# Patient Record
Sex: FEMALE | Race: WHITE | NOT HISPANIC OR LATINO | Employment: FULL TIME | URBAN - METROPOLITAN AREA
[De-identification: names, ages, dates, MRNs, and addresses within clinical notes are randomized per-mention and may not be internally consistent; named-entity substitution may affect disease eponyms.]

---

## 2017-05-01 ENCOUNTER — OFFICE VISIT (OUTPATIENT)
Dept: FAMILY MEDICINE | Facility: CLINIC | Age: 32
End: 2017-05-01
Payer: COMMERCIAL

## 2017-05-01 VITALS
BODY MASS INDEX: 34.97 KG/M2 | HEIGHT: 66 IN | SYSTOLIC BLOOD PRESSURE: 128 MMHG | DIASTOLIC BLOOD PRESSURE: 86 MMHG | HEART RATE: 100 BPM | TEMPERATURE: 99.5 F | WEIGHT: 217.6 LBS

## 2017-05-01 DIAGNOSIS — B65.3 SWIMMER ITCH: Primary | ICD-10-CM

## 2017-05-01 DIAGNOSIS — F41.9 ANXIETY: ICD-10-CM

## 2017-05-01 DIAGNOSIS — G43.919 INTRACTABLE MIGRAINE WITHOUT STATUS MIGRAINOSUS, UNSPECIFIED MIGRAINE TYPE: ICD-10-CM

## 2017-05-01 PROCEDURE — 99203 OFFICE O/P NEW LOW 30 MIN: CPT | Performed by: NURSE PRACTITIONER

## 2017-05-01 RX ORDER — SUMATRIPTAN 50 MG/1
50 TABLET, FILM COATED ORAL
Qty: 9 TABLET | Refills: 5 | Status: SHIPPED | OUTPATIENT
Start: 2017-05-01 | End: 2022-03-04 | Stop reason: DRUGHIGH

## 2017-05-01 RX ORDER — LORAZEPAM 0.5 MG/1
TABLET ORAL
Qty: 20 TABLET | Refills: 0 | Status: SHIPPED | OUTPATIENT
Start: 2017-05-01 | End: 2022-03-04

## 2017-05-01 RX ORDER — TRIAMCINOLONE ACETONIDE 1 MG/G
CREAM TOPICAL
Qty: 30 G | Refills: 0 | Status: SHIPPED | OUTPATIENT
Start: 2017-05-01 | End: 2022-03-04

## 2017-05-01 RX ORDER — BUSPIRONE HYDROCHLORIDE 10 MG/1
10 TABLET ORAL 2 TIMES DAILY
Qty: 60 TABLET | Refills: 2 | Status: SHIPPED | OUTPATIENT
Start: 2017-05-01 | End: 2022-03-04

## 2017-05-01 NOTE — NURSING NOTE
"Chief Complaint   Patient presents with     Insect Bites     Insect Bites on Lower Legs      Health Maintenance     Patient reminded due for pappe/pe tdap, she will re schedule for these things       Initial /86 (BP Location: Left arm, Patient Position: Chair, Cuff Size: Adult Large)  Pulse 100  Temp 99.5  F (37.5  C) (Tympanic)  Ht 5' 5.75\" (1.67 m)  Wt 217 lb 9.6 oz (98.7 kg)  LMP 04/08/2017  Breastfeeding? No  BMI 35.39 kg/m2 Estimated body mass index is 35.39 kg/(m^2) as calculated from the following:    Height as of this encounter: 5' 5.75\" (1.67 m).    Weight as of this encounter: 217 lb 9.6 oz (98.7 kg).  Medication Reconciliation: complete    "
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
## 2017-05-01 NOTE — MR AVS SNAPSHOT
After Visit Summary   5/1/2017    jason Barkley    MRN: [id number]           Patient Information     Date Of Birth          1985        Visit Information        Provider Department      5/1/2017 2:40 PM Kerri Rivera APRN CNP [facility]        Today's Diagnoses     Swimmer itch    -  1    Intractable migraine without status migrainosus, unspecified migraine type        Anxiety          Care Instructions    Swimmers itch    Apply Kenalog cream 3 times daily until symptoms resolve.    Imitrex 1 tablet as needed at onset of the headache, may take second tablet in 2 hours if still having headache.     For anxiety:    Start Buspar 1 tablet twice daily     Ativan 1 tablet as needed for severe anxiety, panic attacks, not for daily use    Follow up in 1-2 months or sooner to recheck treatment plan.     Thank you for choosing [facility].  You may be receiving a survey in the mail from Tiffanie Kruger regarding your visit today.  Please take a few minutes to complete and return the survey to let us know how we are doing.      If you have questions or concerns, please contact us via [employer] or you can contact your care team at [phone].    Our Clinic hours are:  Monday 6:40 am  to 7:00 pm  Tuesday -Friday 6:40 am to 5:00 pm    The [place] outpatient lab hours are:  Monday - Friday 6:10 am to 4:45 pm  Saturdays 7:00 am to 11:00 am  Appointments are required, call [phone]    If you have clinical questions after hours or would like to schedule an appointment,  call the clinic at [phone].          Follow-ups after your visit        Who to contact     If you have questions or need follow up information about today's clinic visit or your schedule please contact [facility] directly at [phone].  Normal or non-critical lab and imaging results will be communicated to you by MyChart, letter or phone within 4 business days after the clinic has received the results.  "If you do not hear from us within 7 days, please contact the clinic through [employer] or phone. If you have a critical or abnormal lab result, we will notify you by phone as soon as possible.  Submit refill requests through [employer] or call your pharmacy and they will forward the refill request to us. Please allow 3 business days for your refill to be completed.          Additional Information About Your Visit        [employer] Information     [employer] lets you send messages to your doctor, view your test results, renew your prescriptions, schedule appointments and more. To sign up, go to [email or web address] . Click on \"Log in\" on the left side of the screen, which will take you to the Welcome page. Then click on \"Sign up Now\" on the right side of the page.     You will be asked to enter the access code listed below, as well as some personal information. Please follow the directions to create your username and password.     Your access code is: [email or web address]  Expires: 2017  2:47 PM     Your access code will  in 90 days. If you need help or a new code, please call your [place] clinic or [phone].        Care EveryWhere ID     This is your Care EveryWhere ID. This could be used by other organizations to access your [place] medical records  [id number]        Your Vitals Were     Pulse Temperature Height Last Period Breastfeeding? BMI (Body Mass Index)    100 99.5  F (37.5  C) (Tympanic) 5' 5.75\" (1.67 m) 2017 No 35.39 kg/m2       Blood Pressure from Last 3 Encounters:   17 128/86    Weight from Last 3 Encounters:   17 217 lb 9.6 oz (98.7 kg)              Today, you had the following     No orders found for display         Today's Medication Changes          These changes are accurate as of: 17  2:47 PM.  If you have any questions, ask your nurse or doctor.               Start taking these medicines.        Dose/Directions    busPIRone 10 MG tablet   Commonly known as:  BUSPAR   Used " for:  Anxiety   Started by:  Kerri Rivera APRN CNP        Dose:  10 mg   Take 1 tablet (10 mg) by mouth 2 times daily   Quantity:  60 tablet   Refills:  2       LORazepam 0.5 MG tablet   Commonly known as:  ATIVAN   Used for:  Anxiety   Started by:  Kerri Rivera APRN CNP        1 tablet as needed for anxiety, panic attacks, not for daily use   Quantity:  20 tablet   Refills:  0       SUMAtriptan 50 MG tablet   Commonly known as:  IMITREX   Used for:  Intractable migraine without status migrainosus, unspecified migraine type   Started by:  Kerri Rivera APRN CNP        Dose:  50 mg   Take 1 tablet (50 mg) by mouth at onset of headache for migraine May repeat in 2 hours. Max 4 tablets/24 hours.   Quantity:  9 tablet   Refills:  5       triamcinolone 0.1 % cream   Commonly known as:  KENALOG   Used for:  Swimmer itch   Started by:  Kerri Rivera APRN CNP        Apply sparingly to affected area three times daily for up to 14 days   Quantity:  30 g   Refills:  0            Where to get your medicines      These medications were sent to [facility] [address]  [address] [facility] [address]     Phone:  [phone]     busPIRone 10 MG tablet    SUMAtriptan 50 MG tablet    triamcinolone 0.1 % cream         Some of these will need a paper prescription and others can be bought over the counter.  Ask your nurse if you have questions.     Bring a paper prescription for each of these medications     LORazepam 0.5 MG tablet                Primary Care Provider    None       No address on file        Thank you!     Thank you for choosing [facility]  for your care. Our goal is always to provide you with excellent care. Hearing back from our patients is one way we can continue to improve our services. Please take a few minutes to complete the written survey that you may receive in the mail after your visit with us. Thank you!              Your Updated Medication List - Protect others around you: Learn how to safely use, store and throw away your medicines at www.disposemymeds.org.          This list is accurate as of: 5/1/17  2:47 PM.  Always use your most recent med list.                   Brand Name Dispense Instructions for use    busPIRone 10 MG tablet    BUSPAR    60 tablet    Take 1 tablet (10 mg) by mouth 2 times daily       LORazepam 0.5 MG tablet    ATIVAN    20 tablet    1 tablet as needed for anxiety, panic attacks, not for daily use       SUMAtriptan 50 MG tablet    IMITREX    9 tablet    Take 1 tablet (50 mg) by mouth at onset of headache for migraine May repeat in 2 hours. Max 4 tablets/24 hours.       triamcinolone 0.1 % cream    KENALOG    30 g    Apply sparingly to affected area three times daily for up to 14 days

## 2017-05-01 NOTE — PATIENT INSTRUCTIONS
Swimmers itch: Apply Kenalog cream 3 times daily until symptoms resolve.    Imitrex 1 tablet as needed at onset of the headache, may take second tablet in 2 hours if still having headache.     For anxiety:    Start Buspar 1 tablet twice daily     Ativan 1 tablet as needed for severe anxiety, panic attacks, not for daily use    Follow up in 1-2 months or sooner to recheck treatment plan.     Thank you for choosing [facility].  You may be receiving a survey in the mail from Tiffanie Kruger regarding your visit today.  Please take a few minutes to complete and return the survey to let us know how we are doing.      If you have questions or concerns, please contact us via [employer] or you can contact your care team at [phone].    Our Clinic hours are:  Monday 6:40 am  to 7:00 pm  Tuesday -Friday 6:40 am to 5:00 pm    The [place] outpatient lab hours are:  Monday - Friday 6:10 am to 4:45 pm  Saturdays 7:00 am to 11:00 am  Appointments are required, call [phone]    If you have clinical questions after hours or would like to schedule an appointment,  call the clinic at [phone].

## 2017-11-15 ENCOUNTER — TELEPHONE (OUTPATIENT)
Dept: FAMILY MEDICINE | Facility: CLINIC | Age: 32
End: 2017-11-15

## 2017-11-15 NOTE — TELEPHONE ENCOUNTER
Panel Management Review      Patient has the following on her problem list: None      Composite cancer screening  Chart review shows that this patient is due/due soon for the following Pap Smear  Summary:    Patient is due/failing the following:   PAP    Action needed:   Patient needs office visit for physical and pap.    Type of outreach:    Sent letter.    Questions for provider review:    None                                                                                                                                    Nanci Bernal

## 2018-02-07 ENCOUNTER — TELEPHONE (OUTPATIENT)
Dept: FAMILY MEDICINE | Facility: CLINIC | Age: 33
End: 2018-02-07

## 2018-02-07 NOTE — LETTER
[facility]  [address] [facility] [place] [address]  [phone]        February 7, 2018  jason Barkley  [address]  [place] [address]    Dear jason,    I care about your health and have reviewed your health plan. I have reviewed your medical conditions, medication list, and lab results and am making recommendations based on this review, to better manage your health.    You are in particular need of attention regarding:  -Cervical Cancer Screening    I am recommending that you:  -schedule a PAP SMEAR EXAM which is due.  Please disregard this reminder if you have had this exam elsewhere within the last year.  It would be helpful for us to have a copy of your recent pap smear report in our file so that we can best coordinate your care.    Here is a list of Health Maintenance topics that are due now or due soon:  Health Maintenance Due   Topic Date Due     MIGRAINE ACTION PLAN  01/27/2004     TETANUS IMMUNIZATION (SYSTEM ASSIGNED)  01/27/2004     PAP SCREENING Q3 YR (SYSTEM ASSIGNED)  01/27/2007     INFLUENZA VACCINE (SYSTEM ASSIGNED)  09/01/2017       Please call us at [phone] (or use [employer]) to address the above recommendations.     Thank you for trusting [facility] and we appreciate the opportunity to serve you.  We look forward to supporting your healthcare needs in the future.    Healthy Regards,    [facility] care team

## 2018-05-18 ENCOUNTER — TELEPHONE (OUTPATIENT)
Dept: FAMILY MEDICINE | Facility: CLINIC | Age: 33
End: 2018-05-18

## 2018-05-18 NOTE — TELEPHONE ENCOUNTER
Panel Management Review        Composite cancer screening  Chart review shows that this patient is due/due soon for the following Pap Smear  Summary:    Patient is due/failing the following:   PAP    Action needed:   Patient needs office visit for physical and pap .    Type of outreach:    Sent letter.    Questions for provider review:    None                                                                                                                                    Nanci Bernal

## 2018-05-18 NOTE — LETTER
[facility]  [address]  [place] [address]  [phone]        May 18, 2018  jason Barkley  [address]  [place] [address]    Dear jason,    I care about your health and have reviewed your health plan. I have reviewed your medical conditions, medication list, and lab results and am making recommendations based on this review, to better manage your health.    You are in particular need of attention regarding:  -Cervical Cancer Screening    I am recommending that you:  -schedule a PAP SMEAR EXAM which is due.  Please disregard this reminder if you have had this exam elsewhere within the last year.  It would be helpful for us to have a copy of your recent pap smear report in our file so that we can best coordinate your care.    Here is a list of Health Maintenance topics that are due now or due soon:  Health Maintenance Due   Topic Date Due     MIGRAINE ACTION PLAN  01/27/2004     TETANUS IMMUNIZATION (SYSTEM ASSIGNED)  01/27/2004     HIV SCREEN (SYSTEM ASSIGNED)  01/27/2004     PAP SCREENING Q3 YR (SYSTEM ASSIGNED)  01/27/2007       Please call us at [phone] (or use [employer]) to address the above recommendations.     Thank you for trusting [facility] and we appreciate the opportunity to serve you.  We look forward to supporting your healthcare needs in the future.    Healthy Regards,    [facility] care team

## 2019-02-27 ENCOUNTER — OFFICE VISIT - HEALTHEAST (OUTPATIENT)
Dept: FAMILY MEDICINE | Facility: CLINIC | Age: 34
End: 2019-02-27

## 2019-02-27 DIAGNOSIS — Z76.89 ENCOUNTER TO ESTABLISH CARE: ICD-10-CM

## 2019-02-27 DIAGNOSIS — B97.7 HIGH RISK HPV INFECTION: ICD-10-CM

## 2019-02-27 DIAGNOSIS — N92.0 MENORRHAGIA WITH REGULAR CYCLE: ICD-10-CM

## 2019-02-27 DIAGNOSIS — J30.81 ALLERGIC RHINITIS DUE TO ANIMAL HAIR AND DANDER: ICD-10-CM

## 2019-02-27 DIAGNOSIS — Z72.0 TOBACCO USE: ICD-10-CM

## 2019-02-27 DIAGNOSIS — J45.20 MILD INTERMITTENT ASTHMA WITHOUT COMPLICATION: ICD-10-CM

## 2019-02-27 DIAGNOSIS — E66.811 CLASS 1 OBESITY DUE TO EXCESS CALORIES WITHOUT SERIOUS COMORBIDITY WITH BODY MASS INDEX (BMI) OF 31.0 TO 31.9 IN ADULT: ICD-10-CM

## 2019-02-27 DIAGNOSIS — R87.613 HSIL (HIGH GRADE SQUAMOUS INTRAEPITHELIAL LESION) ON PAP SMEAR OF CERVIX: ICD-10-CM

## 2019-02-27 DIAGNOSIS — R87.613 HSIL ON PAP SMEAR OF CERVIX: ICD-10-CM

## 2019-02-27 DIAGNOSIS — Z12.4 CERVICAL CANCER SCREENING: ICD-10-CM

## 2019-02-27 DIAGNOSIS — Z00.00 ANNUAL PHYSICAL EXAM: ICD-10-CM

## 2019-02-27 DIAGNOSIS — G43.109 MIGRAINE WITH AURA AND WITHOUT STATUS MIGRAINOSUS, NOT INTRACTABLE: ICD-10-CM

## 2019-02-27 DIAGNOSIS — E66.09 CLASS 1 OBESITY DUE TO EXCESS CALORIES WITHOUT SERIOUS COMORBIDITY WITH BODY MASS INDEX (BMI) OF 31.0 TO 31.9 IN ADULT: ICD-10-CM

## 2019-02-27 LAB
ANION GAP SERPL CALCULATED.3IONS-SCNC: 11 MMOL/L (ref 5–18)
BUN SERPL-MCNC: 18 MG/DL (ref 8–22)
CALCIUM SERPL-MCNC: 9.4 MG/DL (ref 8.5–10.5)
CHLORIDE BLD-SCNC: 104 MMOL/L (ref 98–107)
CHOLEST SERPL-MCNC: 221 MG/DL
CO2 SERPL-SCNC: 26 MMOL/L (ref 22–31)
CREAT SERPL-MCNC: 0.72 MG/DL (ref 0.6–1.1)
ERYTHROCYTE [DISTWIDTH] IN BLOOD BY AUTOMATED COUNT: 11.6 % (ref 11–14.5)
FASTING STATUS PATIENT QL REPORTED: YES
GFR SERPL CREATININE-BSD FRML MDRD: >60 ML/MIN/1.73M2
GLUCOSE BLD-MCNC: 100 MG/DL (ref 70–125)
HCT VFR BLD AUTO: 42.1 % (ref 35–47)
HDLC SERPL-MCNC: 57 MG/DL
HGB BLD-MCNC: 14.3 G/DL (ref 12–16)
LDLC SERPL CALC-MCNC: 137 MG/DL
MCH RBC QN AUTO: 30.8 PG (ref 27–34)
MCHC RBC AUTO-ENTMCNC: 34.1 G/DL (ref 32–36)
MCV RBC AUTO: 90 FL (ref 80–100)
PLATELET # BLD AUTO: 266 THOU/UL (ref 140–440)
PMV BLD AUTO: 7.3 FL (ref 7–10)
POTASSIUM BLD-SCNC: 4 MMOL/L (ref 3.5–5)
RBC # BLD AUTO: 4.65 MILL/UL (ref 3.8–5.4)
SODIUM SERPL-SCNC: 141 MMOL/L (ref 136–145)
TRIGL SERPL-MCNC: 135 MG/DL
TSH SERPL DL<=0.005 MIU/L-ACNC: 1.22 UIU/ML (ref 0.3–5)
WBC: 10.1 THOU/UL (ref 4–11)

## 2019-02-27 ASSESSMENT — MIFFLIN-ST. JEOR: SCORE: 1625.52

## 2019-02-28 LAB
HPV SOURCE: ABNORMAL
HUMAN PAPILLOMA VIRUS 16 DNA: POSITIVE
HUMAN PAPILLOMA VIRUS 18 DNA: NEGATIVE
HUMAN PAPILLOMA VIRUS FINAL DIAGNOSIS: ABNORMAL
HUMAN PAPILLOMA VIRUS OTHER HR: NEGATIVE
SPECIMEN DESCRIPTION: ABNORMAL

## 2019-03-02 ENCOUNTER — COMMUNICATION - HEALTHEAST (OUTPATIENT)
Dept: FAMILY MEDICINE | Facility: CLINIC | Age: 34
End: 2019-03-02

## 2019-03-07 ENCOUNTER — OFFICE VISIT - HEALTHEAST (OUTPATIENT)
Dept: ALLERGY | Facility: CLINIC | Age: 34
End: 2019-03-07

## 2019-03-07 DIAGNOSIS — T63.441A BEE STING REACTION, ACCIDENTAL OR UNINTENTIONAL, INITIAL ENCOUNTER: ICD-10-CM

## 2019-03-07 DIAGNOSIS — J45.30 MILD PERSISTENT ASTHMA WITHOUT COMPLICATION: ICD-10-CM

## 2019-03-07 DIAGNOSIS — J30.81 ALLERGIC RHINITIS DUE TO ANIMALS: ICD-10-CM

## 2019-03-07 ASSESSMENT — MIFFLIN-ST. JEOR: SCORE: 1622.35

## 2019-03-08 ENCOUNTER — COMMUNICATION - HEALTHEAST (OUTPATIENT)
Dept: FAMILY MEDICINE | Facility: CLINIC | Age: 34
End: 2019-03-08

## 2019-03-08 LAB
BKR LAB AP ABNORMAL BLEEDING: NO
BKR LAB AP BIRTH CONTROL/HORMONES: ABNORMAL
BKR LAB AP CERVICAL APPEARANCE: NORMAL
BKR LAB AP GYN ADEQUACY: ABNORMAL
BKR LAB AP GYN INTERPRETATION: ABNORMAL
BKR LAB AP HPV REFLEX: ABNORMAL
BKR LAB AP LMP: ABNORMAL
BKR LAB AP PATIENT STATUS: ABNORMAL
BKR LAB AP PREVIOUS ABNORMAL: ABNORMAL
BKR LAB AP PREVIOUS NORMAL: ABNORMAL
HIGH RISK?: NO
INTERPRETING LAB: ABNORMAL
PATH REPORT.COMMENTS IMP SPEC: ABNORMAL
RESULT FLAG (HE HISTORICAL CONVERSION): ABNORMAL

## 2019-03-19 ENCOUNTER — RECORDS - HEALTHEAST (OUTPATIENT)
Dept: ADMINISTRATIVE | Facility: OTHER | Age: 34
End: 2019-03-19

## 2019-04-02 ENCOUNTER — OFFICE VISIT - HEALTHEAST (OUTPATIENT)
Dept: FAMILY MEDICINE | Facility: CLINIC | Age: 34
End: 2019-04-02

## 2019-04-02 ENCOUNTER — COMMUNICATION - HEALTHEAST (OUTPATIENT)
Dept: FAMILY MEDICINE | Facility: CLINIC | Age: 34
End: 2019-04-02

## 2019-04-02 DIAGNOSIS — Z01.818 ENCOUNTER FOR PREOPERATIVE EXAMINATION FOR GENERAL SURGICAL PROCEDURE: ICD-10-CM

## 2019-04-02 DIAGNOSIS — R87.613 HSIL ON PAP SMEAR OF CERVIX: ICD-10-CM

## 2019-04-02 LAB
HCG UR QL: NEGATIVE
HGB BLD-MCNC: 14.1 G/DL (ref 12–16)

## 2019-04-02 ASSESSMENT — MIFFLIN-ST. JEOR: SCORE: 1628.02

## 2019-04-12 ASSESSMENT — MIFFLIN-ST. JEOR: SCORE: 1626.88

## 2019-04-15 ENCOUNTER — ANESTHESIA - HEALTHEAST (OUTPATIENT)
Dept: SURGERY | Facility: AMBULATORY SURGERY CENTER | Age: 34
End: 2019-04-15

## 2019-04-16 ENCOUNTER — SURGERY - HEALTHEAST (OUTPATIENT)
Dept: SURGERY | Facility: AMBULATORY SURGERY CENTER | Age: 34
End: 2019-04-16

## 2019-04-16 ASSESSMENT — MIFFLIN-ST. JEOR: SCORE: 1626.88

## 2019-04-18 ENCOUNTER — OFFICE VISIT - HEALTHEAST (OUTPATIENT)
Dept: ALLERGY | Facility: CLINIC | Age: 34
End: 2019-04-18

## 2019-04-18 DIAGNOSIS — J30.81 ALLERGIC RHINITIS DUE TO ANIMALS: ICD-10-CM

## 2020-01-02 ENCOUNTER — COMMUNICATION - HEALTHEAST (OUTPATIENT)
Dept: FAMILY MEDICINE | Facility: CLINIC | Age: 35
End: 2020-01-02

## 2021-02-02 ENCOUNTER — OFFICE VISIT - HEALTHEAST (OUTPATIENT)
Dept: FAMILY MEDICINE | Facility: CLINIC | Age: 36
End: 2021-02-02

## 2021-02-02 DIAGNOSIS — Z13.1 SCREENING FOR DIABETES MELLITUS: ICD-10-CM

## 2021-02-02 DIAGNOSIS — Z00.00 ANNUAL PHYSICAL EXAM: ICD-10-CM

## 2021-02-02 DIAGNOSIS — Z72.0 TOBACCO USE: ICD-10-CM

## 2021-02-02 DIAGNOSIS — Z83.3 FAMILY HISTORY OF DIABETES MELLITUS: ICD-10-CM

## 2021-02-02 DIAGNOSIS — Z91.030 ALLERGY TO HONEY BEE VENOM: ICD-10-CM

## 2021-02-02 DIAGNOSIS — N60.12 FIBROCYSTIC BREAST CHANGES OF BOTH BREASTS: ICD-10-CM

## 2021-02-02 DIAGNOSIS — Z11.4 SCREENING FOR HUMAN IMMUNODEFICIENCY VIRUS WITHOUT PRESENCE OF RISK FACTORS: ICD-10-CM

## 2021-02-02 DIAGNOSIS — G43.109 MIGRAINE WITH AURA AND WITHOUT STATUS MIGRAINOSUS, NOT INTRACTABLE: ICD-10-CM

## 2021-02-02 DIAGNOSIS — Z28.21 INFLUENZA VACCINATION DECLINED: ICD-10-CM

## 2021-02-02 DIAGNOSIS — Z23 IMMUNIZATION DUE: ICD-10-CM

## 2021-02-02 DIAGNOSIS — Z87.42 HISTORY OF ABNORMAL CERVICAL PAP SMEAR: ICD-10-CM

## 2021-02-02 DIAGNOSIS — Z13.220 LIPID SCREENING: ICD-10-CM

## 2021-02-02 DIAGNOSIS — K64.4 RESIDUAL HEMORRHOIDAL SKIN TAGS: ICD-10-CM

## 2021-02-02 DIAGNOSIS — Z11.59 ENCOUNTER FOR HEPATITIS C SCREENING TEST FOR LOW RISK PATIENT: ICD-10-CM

## 2021-02-02 DIAGNOSIS — J45.20 MILD INTERMITTENT ASTHMA WITHOUT COMPLICATION: ICD-10-CM

## 2021-02-02 DIAGNOSIS — N60.11 FIBROCYSTIC BREAST CHANGES OF BOTH BREASTS: ICD-10-CM

## 2021-02-02 LAB
ALBUMIN SERPL-MCNC: 4.1 G/DL (ref 3.5–5)
ALP SERPL-CCNC: 69 U/L (ref 45–120)
ALT SERPL W P-5'-P-CCNC: 27 U/L (ref 0–45)
ANION GAP SERPL CALCULATED.3IONS-SCNC: 11 MMOL/L (ref 5–18)
AST SERPL W P-5'-P-CCNC: 20 U/L (ref 0–40)
BILIRUB SERPL-MCNC: 0.7 MG/DL (ref 0–1)
BUN SERPL-MCNC: 10 MG/DL (ref 8–22)
CALCIUM SERPL-MCNC: 8.8 MG/DL (ref 8.5–10.5)
CHLORIDE BLD-SCNC: 105 MMOL/L (ref 98–107)
CHOLEST SERPL-MCNC: 172 MG/DL
CO2 SERPL-SCNC: 21 MMOL/L (ref 22–31)
CREAT SERPL-MCNC: 0.73 MG/DL (ref 0.6–1.1)
ERYTHROCYTE [DISTWIDTH] IN BLOOD BY AUTOMATED COUNT: 15.3 % (ref 11–14.5)
FASTING STATUS PATIENT QL REPORTED: YES
GFR SERPL CREATININE-BSD FRML MDRD: >60 ML/MIN/1.73M2
GLUCOSE BLD-MCNC: 85 MG/DL (ref 70–125)
HCT VFR BLD AUTO: 39.4 % (ref 35–47)
HDLC SERPL-MCNC: 52 MG/DL
HGB BLD-MCNC: 13 G/DL (ref 12–16)
HIV 1+2 AB+HIV1 P24 AG SERPL QL IA: NEGATIVE
LDLC SERPL CALC-MCNC: 104 MG/DL
MCH RBC QN AUTO: 27.4 PG (ref 27–34)
MCHC RBC AUTO-ENTMCNC: 33 G/DL (ref 32–36)
MCV RBC AUTO: 83 FL (ref 80–100)
PLATELET # BLD AUTO: 291 THOU/UL (ref 140–440)
PMV BLD AUTO: 9 FL (ref 7–10)
POTASSIUM BLD-SCNC: 4 MMOL/L (ref 3.5–5)
PROT SERPL-MCNC: 6.9 G/DL (ref 6–8)
RBC # BLD AUTO: 4.75 MILL/UL (ref 3.8–5.4)
SODIUM SERPL-SCNC: 137 MMOL/L (ref 136–145)
TRIGL SERPL-MCNC: 81 MG/DL
TSH SERPL DL<=0.005 MIU/L-ACNC: 0.97 UIU/ML (ref 0.3–5)
WBC: 9.1 THOU/UL (ref 4–11)

## 2021-02-02 ASSESSMENT — ANXIETY QUESTIONNAIRES
4. TROUBLE RELAXING: SEVERAL DAYS
GAD7 TOTAL SCORE: 6
7. FEELING AFRAID AS IF SOMETHING AWFUL MIGHT HAPPEN: SEVERAL DAYS
6. BECOMING EASILY ANNOYED OR IRRITABLE: SEVERAL DAYS
IF YOU CHECKED OFF ANY PROBLEMS ON THIS QUESTIONNAIRE, HOW DIFFICULT HAVE THESE PROBLEMS MADE IT FOR YOU TO DO YOUR WORK, TAKE CARE OF THINGS AT HOME, OR GET ALONG WITH OTHER PEOPLE: NOT DIFFICULT AT ALL
1. FEELING NERVOUS, ANXIOUS, OR ON EDGE: SEVERAL DAYS
5. BEING SO RESTLESS THAT IT IS HARD TO SIT STILL: NOT AT ALL
3. WORRYING TOO MUCH ABOUT DIFFERENT THINGS: SEVERAL DAYS
2. NOT BEING ABLE TO STOP OR CONTROL WORRYING: SEVERAL DAYS

## 2021-02-02 ASSESSMENT — MIFFLIN-ST. JEOR: SCORE: 1678.48

## 2021-02-02 ASSESSMENT — PATIENT HEALTH QUESTIONNAIRE - PHQ9: SUM OF ALL RESPONSES TO PHQ QUESTIONS 1-9: 7

## 2021-02-03 ENCOUNTER — COMMUNICATION - HEALTHEAST (OUTPATIENT)
Dept: FAMILY MEDICINE | Facility: CLINIC | Age: 36
End: 2021-02-03

## 2021-02-03 LAB
HBA1C MFR BLD: 5.2 %
HCV AB SERPL QL IA: NEGATIVE
HPV SOURCE: NORMAL
HUMAN PAPILLOMA VIRUS 16 DNA: NEGATIVE
HUMAN PAPILLOMA VIRUS 18 DNA: NEGATIVE
HUMAN PAPILLOMA VIRUS FINAL DIAGNOSIS: NORMAL
HUMAN PAPILLOMA VIRUS OTHER HR: NEGATIVE
SPECIMEN DESCRIPTION: NORMAL

## 2021-02-12 ENCOUNTER — COMMUNICATION - HEALTHEAST (OUTPATIENT)
Dept: OBGYN | Facility: CLINIC | Age: 36
End: 2021-02-12

## 2021-02-12 LAB
BKR LAB AP ABNORMAL BLEEDING: NO
BKR LAB AP BIRTH CONTROL/HORMONES: NORMAL
BKR LAB AP CERVICAL APPEARANCE: NORMAL
BKR LAB AP GYN ADEQUACY: NORMAL
BKR LAB AP GYN INTERPRETATION: NORMAL
BKR LAB AP HPV REFLEX: NORMAL
BKR LAB AP LMP: NORMAL
BKR LAB AP PATIENT STATUS: NORMAL
BKR LAB AP PREVIOUS ABNORMAL: NORMAL
BKR LAB AP PREVIOUS NORMAL: NORMAL
HIGH RISK?: YES
PATH REPORT.COMMENTS IMP SPEC: NORMAL
RESULT FLAG (HE HISTORICAL CONVERSION): NORMAL

## 2021-03-02 ENCOUNTER — COMMUNICATION - HEALTHEAST (OUTPATIENT)
Dept: FAMILY MEDICINE | Facility: CLINIC | Age: 36
End: 2021-03-02
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
## 2021-05-19 ENCOUNTER — RECORDS - HEALTHEAST (OUTPATIENT)
Dept: ADMINISTRATIVE | Facility: OTHER | Age: 36
End: 2021-05-19

## 2021-05-19 ENCOUNTER — COMMUNICATION - HEALTHEAST (OUTPATIENT)
Dept: FAMILY MEDICINE | Facility: CLINIC | Age: 36
End: 2021-05-19

## 2021-05-27 NOTE — PATIENT INSTRUCTIONS - HE
Hold all supplements, aspirin and NSAIDs for 7 days prior to surgery.  Follow your surgeon's direction on when to stop eating and drinking prior to surgery.  Remove all jewelry and metal piercings before your surgery.   Remove nail polish from fingers before surgery.

## 2021-05-27 NOTE — TELEPHONE ENCOUNTER
Reason contacted:  Lab results  Information relayed:  MD message below  Additional questions:  No  Further follow-up needed:  No  Okay to leave a detailed message:  TANNER    Faxed pre-op to [facility] at [phone].

## 2021-05-27 NOTE — ANESTHESIA PREPROCEDURE EVALUATION
Anesthesia Evaluation        Airway   Mallampati: II  Neck ROM: full   Pulmonary - normal exam   (+) asthma                           Cardiovascular - negative ROS and normal exam   Neuro/Psych      Endo/Other - negative ROS      GI/Hepatic/Renal - negative ROS           Dental - normal exam                        Anesthesia Plan  Planned anesthetic: MAC    ASA 2     Anesthetic plan and risks discussed with: patient    Post-op plan: routine recovery

## 2021-05-27 NOTE — TELEPHONE ENCOUNTER
----- Message from Betty Awad DO sent at 4/2/2019  9:03 AM CDT -----  Please call Elizabeth and let her know her labs are normal.  Please fax preop to Dr. Rose/[facility].  Thanks!

## 2021-05-27 NOTE — ANESTHESIA POSTPROCEDURE EVALUATION
Patient: Elizabeth Barkley  COLD KNIFE CONIZATION OFCERVIX  Anesthesia type: MAC    Patient location: PACU  Last vitals:   Vitals Value Taken Time   /74 4/16/2019  9:20 AM   Temp 36.6  C (97.9  F) 4/16/2019  8:30 AM   Pulse 70 4/16/2019  9:20 AM   Resp 16 4/16/2019  9:20 AM   SpO2 99 % 4/16/2019  9:20 AM     Post vital signs: stable  Level of consciousness: awake and responds to simple questions  Post-anesthesia pain: pain controlled  Post-anesthesia nausea and vomiting: no  Pulmonary: unassisted, return to baseline  Cardiovascular: stable and blood pressure at baseline  Hydration: adequate  Anesthetic events: no    QCDR Measures:  ASA# 11 - Diane-op Cardiac Arrest: ASA11B - Patient did NOT experience unanticipated cardiac arrest  ASA# 12 - Diane-op Mortality Rate: ASA12B - Patient did NOT die  ASA# 13 - PACU Re-Intubation Rate: ASA13B - Patient did NOT require a new airway mgmt  ASA# 10 - Composite Anes Safety: ASA10A - No serious adverse event    Additional Notes:

## 2021-05-27 NOTE — ANESTHESIA CARE TRANSFER NOTE
Last vitals:   Vitals:    04/16/19 0830   BP: (P) 114/67   Pulse: (P) 87   Resp: (P) 16   Temp: (P) 36.6  C (97.9  F)   SpO2: (P) 97%     Patient's level of consciousness is awake  Spontaneous respirations: yes  Maintains airway independently: yes  Dentition unchanged: yes  Oropharynx: oropharynx clear of all foreign objects    QCDR Measures:  ASA# 20 - Surgical Safety Checklist: WHO surgical safety checklist completed prior to induction    PQRS# 430 - Adult PONV Prevention: 4558F - Pt received => 2 anti-emetic agents (different classes) preop & intraop  ASA# 8 - Peds PONV Prevention: NA - Not pediatric patient, not GA or 2 or more risk factors NOT present  PQRS# 424 - Diane-op Temp Management: 4559F - At least one body temp DOCUMENTED => 35.5C or 95.9F within required timeframe  PQRS# 426 - PACU Transfer Protocol: - Transfer of care checklist used  ASA# 14 - Acute Post-op Pain: ASA14B - Patient did NOT experience pain >= 7 out of 10

## 2021-05-27 NOTE — PATIENT INSTRUCTIONS - HE
Assessment:     Allergic rhinitis with specific sensitivity to tree pollen (spring), weed pollen (late summer- freeze), mold, cat and dog  Persistent asthma with exercise and allergy trigger.   History of bee sting allergy with large local reaction and shortness of breath.     Plan:      Continue montelukast 10 mg daily  Fluticasone 1 spray AM and PM could be added  Continue albuterol 2 puffs every 4 hours.  Consider pretreating 20 minutes before exercise  Recommend having an EpiPen available however testing could be done for hymenoptera venom allergy.       Venom testing 5/16 at 8:00

## 2021-05-27 NOTE — PROGRESS NOTES
Preoperative Exam    Scheduled Procedure: Cold Knife Conization of the Cervix  Surgery Date:  4/16/19  Surgery Location: [facility], fax [phone]    Surgeon:  Dr. Rose    Assessment/Plan:     1. Encounter for preoperative examination for general surgical procedure  2. 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    Please Note:  patient has asthma, on albuterol and singulair, pet dander is trigger    Functional Status: Independent  Patient plans to recover at home with family.     Patient Instructions     Hold all supplements, aspirin and NSAIDs for 7 days prior to surgery.  Follow your surgeon's direction on when to stop eating and drinking prior to surgery.  Remove all jewelry and metal piercings before your surgery.   Remove nail polish from fingers before surgery.    Subjective:      Elizabeth Barkley is a 34 y.o. female who presents for a preoperative consultation.  Pap smear on 2/27/19 showed HSIL encompassing mod/severe dysplasia, CIS, CIN2, CIN3.     All other systems reviewed and are negative, other than those listed in the HPI. HPV16 positive. She was referred to OBGYN for colposcopy. She is now scheduled for conization of cervix with Dr. Rose at the [facility] on 4/16/19.     Elizabeth has previously had surgery, tolerated anesthesia well without difficulties.  No chest pain or shortness of breath with physical activity.  Asthma is reasonably well controlled.  Asthma trigger is dogs, not willing to get rid of her dogs.  Is working  with an allergist, will consider allergy injections if symptoms are not improving.  No recent illnesses.    She is still smoking 1 pack/day, not ready to quit yet.    Pertinent History  Do you have difficulty breathing or chest pain after walking up a flight of stairs: No  History of obstructive sleep apnea: No  Steroid use in the last 6 months: No  Frequent Aspirin/NSAID use: No  Prior Blood Transfusion: No  Prior Blood Transfusion Reaction: No  If for some reason prior to, during or after the procedure, if it is medically indicated, would you be willing to have a blood transfusion?:  There is no transfusion refusal.    Current Outpatient Medications   Medication Sig Dispense Refill     albuterol (PROAIR HFA;PROVENTIL HFA;VENTOLIN HFA) 90 mcg/actuation inhaler Inhale 2 puffs every 6 (six) hours as needed for wheezing. 1 each 6     EPINEPHrine (EPIPEN/ADRENACLICK/AUVI-Q) 0.3 mg/0.3 mL injection Inject 0.3 mL (0.3 mg total) as directed as needed for anaphylaxis. Inject into thigh. 2 Pre-filled Pen Syringe 0     loratadine (CLARITIN ORAL) Take by mouth.       montelukast (SINGULAIR) 10 mg tablet Take 1 tablet (10 mg total) by mouth at bedtime. 90 tablet 3     SUMAtriptan (IMITREX) 100 MG tablet Take 1 tablet (100 mg total) by mouth once as needed for migraine. No more than 9 tablets per month. 30 tablet 2     No current facility-administered medications for this visit.         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 Referral placed for OBGYN for further management.        Past Surgical History:   Procedure Laterality Date     LAPAROSCOPIC CHOLECYSTECTOMY  2013     TONSILLECTOMY AND ADENOIDECTOMY         Social History     Socioeconomic History     Marital status:      Spouse name: Tony     Number of children: 1   Occupational History     Occupation:      Comment: [employer]   Tobacco Use     Smoking status: Current Every Day Smoker     Packs/day: 1.00     Years: 12.00     Pack years: 12.00     Smokeless tobacco: Never Used   Substance and Sexual Activity     Alcohol use: Yes     Comment: Social     Drug use: No     Sexual activity: Yes     Partners: Male     Birth control/protection: None       Patient Care Team:  Betty Awad DO as PCP - General (Family Medicine)      Objective:     Vitals:    04/02/19 0750   BP: 100/78   Pulse: 100   Resp: 20   Temp: 98.1  F (36.7  C)   TempSrc: Oral   Weight: 200 lb 4 oz (90.8 kg)   Height: 5' 6.5\" (1.689 m)         Physical Exam:  GENERAL: Elizabeth is a pleasant, obese female, no acute distress.  Appears stated age.  HEENT: Sclera white, no nasal discharge, no cervical or supraclavicular lymphadenopathy.  HEART: Regular rate and rhythm, no murmurs.  LUNGS: Clear to auscultation bilaterally, unlabored.  No wheezing.  ABDOMEN: Soft, nontender to palpation.  EXTREMITIES: No lower extremity edema, pulses intact.    Labs:  Recent Results (from the past 24 hour(s))   Hemoglobin    Collection Time: 04/02/19  8:20 AM   Result Value Ref Range    Hemoglobin 14.1 12.0 - 16.0 g/dL   Pregnancy, Urine    Collection Time: 04/02/19  8:42 AM   Result Value Ref Range    Pregnancy Test, Urine Negative Negative        There is no immunization history on file for this patient.    Electronically signed by Betty Awad DO 04/02/19 7:53 AM  "

## 2021-05-28 ASSESSMENT — ASTHMA QUESTIONNAIRES: ACT_TOTALSCORE: 18

## 2021-05-28 ASSESSMENT — ANXIETY QUESTIONNAIRES: GAD7 TOTAL SCORE: 6

## 2021-05-28 NOTE — PROGRESS NOTES
Assessment:     Allergic rhinitis with specific sensitivity to tree pollen (spring), weed pollen (late summer- freeze), mold, cat and dog  Persistent asthma with exercise and allergy trigger.   History of bee sting allergy with large local reaction and shortness of breath.     Plan:      Continue montelukast 10 mg daily  Fluticasone 1 spray AM and PM could be added  Continue albuterol 2 puffs every 4 hours.  Consider pretreating 20 minutes before exercise  Recommend having an EpiPen available however testing could be done for hymenoptera venom allergy.    Patient is scheduled for hymenoptera venom testing this next month.  ____________________________________________________________________________     Patient returns today for follow-up.  She is previously seen a month ago.  Since that time she has been using montelukast daily.  She has not yet started her fluticasone nasal spray.  Overall she feels that her allergy symptoms are improved.  She also worked on improving her environment.  She does not have time for hymenoptera venom testing today.  She like to reschedule for a different day.    Physical Exam:  General:  Alert and Oriented.  Eyes:  Sclera clear. Nose: pale boggy mucosal membranes.  Throat:  pink moist, no lesions.  Lungs:  clear to auscultation. Skin:  no rashes

## 2021-06-02 VITALS — BODY MASS INDEX: 31.43 KG/M2 | HEIGHT: 67 IN | WEIGHT: 200.25 LBS

## 2021-06-02 VITALS — HEIGHT: 67 IN | BODY MASS INDEX: 31.23 KG/M2 | WEIGHT: 199 LBS

## 2021-06-02 VITALS — WEIGHT: 199.7 LBS | BODY MASS INDEX: 31.34 KG/M2 | HEIGHT: 67 IN

## 2021-06-03 VITALS — BODY MASS INDEX: 31.39 KG/M2 | HEIGHT: 67 IN | WEIGHT: 200 LBS

## 2021-06-14 NOTE — PROGRESS NOTES
Name: Elizabeth Barkley  : 1985   MRN: [id number]    ASSESSMENT & PLAN:   Elizabeth Barkley is a 36 y.o. female presenting today for annual physical exam.     1. Annual physical exam  - HM2(CBC w/o Differential)    Reviewed health and health maintenance recommendations.  Declines influenza vaccine.  Encourage smoking cessation, she is not ready yet.  She is aware of elevated weight and working on lifestyle modifications.  Due to increased stressors in life right now, recommend she check out [place] mindfulness course online for free or the [employer] hansa trial.     2. History of abnormal cervical Pap smear  - Gynecologic Cytology (PAP Smear)    History HSIL with high risk positive HPV status post conization 2018.  Following up today for repeat Pap smear, overdue for surveillance.  Pap team will be in touch with Pap smear results.    3. Mild intermittent asthma without complication  Asthma is not adequately controlled today.  Primary trigger is allergen to pets.  Previously trialed Singulair, but did not find this any more effective than Claritin.  She is taking Claritin as needed in the morning.  Recommend scheduling Claritin nightly.    We will reach out in 1 month to recheck ACT.    4. Migraine with aura and without status migrainosus, not intractable  - SUMAtriptan (IMITREX) 100 MG tablet; Take 1 tablet (100 mg total) by mouth once as needed for migraine. No more than 9 tablets per month.  Dispense: 30 tablet; Refill: 2    Migraines are adequately controlled with Imitrex.  Blood pressure is well controlled.  Continue as needed.    5. BMI 34.0-34.9,adult  - Thyroid Cascade    We will rule out diabetes, hyperlipidemia, and thyroid disease as contributing factors to obesity.  Per above, continue to work on healthy lifestyle modifications.    6. Tobacco use  Encourage patient to check out quit partner [place] website for resources for smoking cessation.  Reach out if and when she desires further assistance.    7.  Hx allergic reaction to bee sting  - EPINEPHrine (EPIPEN/ADRENACLICK/AUVI-Q) 0.3 mg/0.3 mL injection; Inject 0.3 mL (0.3 mg total) as directed as needed for anaphylaxis. Inject into thigh.  Dispense: 2 Pre-filled Pen Syringe; Refill: 0    8. Residual hemorrhoidal skin tags  Prominent on physical exam.  Minimally bothersome to patient.  Continue to monitor constipation.  If she desires further evaluation, discussed referral [facility].    9. Family history of diabetes mellitus  10. Screening for diabetes mellitus  - Glycosylated Hemoglobin A1c    11. Lipid screening  - Lipid Cascade FASTING  - Comprehensive Metabolic Panel    12. Immunization due  - TDaP booster     13. Encounter for hepatitis C screening test for low risk patient  - Hepatitis C Antibody (Anti-HCV)    14. Screening for human immunodeficiency virus without presence of risk factors  - HIV Antigen/Antibody Screening Cascade    15. Influenza vaccination declined       Return in about 1 month (around 3/2/2021) for We will reach out for follow up asthma in 1 month. .    Betty Awad, [facility]            Elizabeth Barkley is a 36 y.o. female presenting to discuss the following:     CC:   Chief Complaint   Patient presents with     Annual Exam     Fasting, f/u o abnormal pap     Migraine     refill of sumatriptan       HPI:  Tobacco - not ready to quit smoking.    Asthma - symptomatic in the morning, takes claritin in the morning, using albuterol PRN.   ACT: 18/25. Singulair wasn't any more effective the Claritin.    Migraines - well controlled with Imitrex. Needs refill today.     PHQ9: 7/27  GAD7: 6/21    Is working on weight management. Family history of diabetes. Wants to be screened today.     ROS:   10 point ROS negative except for as reported above.     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 Referral placed for OBGYN for further management.        PSH:   Past Surgical History:   Procedure Laterality Date     LAPAROSCOPIC CHOLECYSTECTOMY  2013     OH CONIZATION CERVIX,KNIFE/LASER N/A 4/16/2019    Procedure: COLD KNIFE CONIZATION OFCERVIX;  Surgeon: Alicja Bonilla MD;  [facility];  Service: Gynecology     TONSILLECTOMY AND ADENOIDECTOMY           MEDICATIONS:   Current Outpatient Medications on File Prior to Visit   Medication Sig Dispense Refill     albuterol (PROAIR HFA;PROVENTIL HFA;VENTOLIN HFA) 90 mcg/actuation inhaler Inhale 2 puffs every 6 (six) hours as needed for wheezing. 1 each 6     EPINEPHrine (EPIPEN/ADRENACLICK/AUVI-Q) 0.3 mg/0.3 mL injection Inject 0.3 mL (0.3 mg total) as directed as needed for anaphylaxis. Inject into thigh. 2 Pre-filled Pen Syringe 0     SUMAtriptan (IMITREX) 100 MG tablet Take 1 tablet (100 mg total) by mouth once as needed for migraine. No more than 9 tablets per month. 30 tablet 2     montelukast (SINGULAIR) 10 mg tablet Take 1 tablet (10 mg total) by mouth at bedtime. 90 tablet 3     [DISCONTINUED] HYDROcodone-acetaminophen (NORCO) 5-325 mg per tablet Take 1 tablet by mouth every 4 (four) hours as needed for pain. 13 tablet 0     [DISCONTINUED] loratadine (CLARITIN ORAL) Take by mouth.       No current facility-administered medications on file prior to visit.        ALLERGIES:  Allergies   Allergen Reactions     Cat Dander Hives and Shortness Of Breath     Dog Dander Hives and Shortness Of Breath     Flowers Shortness Of Breath     Pollen Extracts Shortness Of Breath     Squires        FAMHx:  Family  "History   Problem Relation Age of Onset     Hypertension Mother      Anxiety disorder Mother      Hypertension Father      Anxiety disorder Father      Migraines Sister      Migraines Brother      Diabetes Maternal Grandmother      Heart disease Maternal Grandfather         MI @ 75     Gallbladder disease Sister      Colon cancer Neg Hx      Breast cancer Neg Hx        SOCIAL HISTORY:   Social History     Tobacco Use     Smoking status: Current Every Day Smoker     Packs/day: 1.00     Years: 12.00     Pack years: 12.00     Smokeless tobacco: Never Used   Substance Use Topics     Alcohol use: Yes     Comment: Social     Drug use: No         PHYSICAL EXAM:   /80   Pulse 88   Temp 97.7  F (36.5  C) (Oral)   Ht 5' 5.75\" (1.67 m)   Wt 214 lb (97.1 kg)   LMP 01/19/2021   BMI 34.80 kg/m     GENERAL: Elizabeth is a pleasant, nontoxic-appearing female, no acute distress.  She is obese.  Appears stated age.  HEENT: Sclera white, no cervical lymphadenopathy, no thyromegaly.  HEART: Regular rate and rhythm, no murmurs.  LUNGS: Clear to auscultation bilaterally, unlabored.  BREAST: Bilateral fibrocystic breast changes.  All lesions are soft, nontender to palpation.  And mobile.  No irregularities or calcifications appreciated.  No cervical lymphadenopathy.  No nipple inversion or discharge.  No skin changes.  ABDOMEN: Abdomen is soft, nontender to palpation.  No palpable masses.  : No external genital lesions.  Physiologic discharge.  Cervix is without lesions.  Perianal tissue with significant skin tags present.  MSK: No gross deformities or effusions.  NEURO: No gross deficits present.  PSYCH: Mood is stressed, normal affect, appropriately groomed.  Normal thought content.  DERM: No rashes present.         "

## 2021-06-14 NOTE — PROGRESS NOTES
Normal CBC, TSH, lipid panel, CMP, and A1c.  Negative HIV and hepatitis C screens.  Patient updated by [employer] message.  Betty Awad, DO

## 2021-06-14 NOTE — PATIENT INSTRUCTIONS - HE
I am sorry to hear it has been such a difficult year.  I am hoping 2021 is better for you.  Early congratulations on your graduation.    I recommend looking into the [place] mindfulness course, free online, or the [employer] hansa trial to help manage some of the stresses you are experiencing.    Let me know if you would like a referral to therapy or trial of medication.    I do encourage you to look into smoking cessation.  First-line I recommend patients check with the quit partner [place] website for free nicotine replacement products.    The Pap nurse will be in touch with your Pap smear results and follow-up plan.    Imitrex refill sent to the pharmacy.    We will reach out in a month to recheck your asthma control symptoms.  Take your Claritin daily at bedtime to see if this helps with the morning symptoms.    Let me know when you need a refill of your albuterol.    Keep working on healthy lifestyle modifications and regular physical activity.    Please reach out with any other questions or concerns.

## 2021-06-17 NOTE — TELEPHONE ENCOUNTER
Called pt and went over ACT questions. Flow sheet updated in patients chart. Also updated COVID vaccine per pt.

## 2021-06-18 NOTE — LETTER
Letter by Betty Awad DO at      Author: Betty Awad DO Service: -- Author Type: --    Filed:  Encounter Date: 3/2/2019 Status: (Other)       Elizabeth Barkley  [address]  [place] [address]             March 2, 2019         Dear Ms. Barkley,    Below are the results from your recent visit:    Resulted Orders   Thyroid Cascade   Result Value Ref Range    TSH 1.22 0.30 - 5.00 uIU/mL   Basic Metabolic Panel   Result Value Ref Range    Sodium 141 136 - 145 mmol/L    Potassium 4.0 3.5 - 5.0 mmol/L    Chloride 104 98 - 107 mmol/L    CO2 26 22 - 31 mmol/L    Anion Gap, Calculation 11 5 - 18 mmol/L    Glucose 100 70 - 125 mg/dL    Calcium 9.4 8.5 - 10.5 mg/dL    BUN 18 8 - 22 mg/dL    Creatinine 0.72 0.60 - 1.10 mg/dL    GFR MDRD Af Amer >60 >60 mL/min/1.73m2    GFR MDRD Non Af Amer >60 >60 mL/min/1.73m2    Narrative    Fasting Glucose reference range is 70-99 mg/dL per  American Diabetes Association (ADA) guidelines.   HM2(CBC w/o Differential)   Result Value Ref Range    WBC 10.1 4.0 - 11.0 thou/uL    RBC 4.65 3.80 - 5.40 mill/uL    Hemoglobin 14.3 12.0 - 16.0 g/dL    Hematocrit 42.1 35.0 - 47.0 %    MCV 90 80 - 100 fL    MCH 30.8 27.0 - 34.0 pg    MCHC 34.1 32.0 - 36.0 g/dL    RDW 11.6 11.0 - 14.5 %    Platelets 266 140 - 440 thou/uL    MPV 7.3 7.0 - 10.0 fL   Lipid Cascade RANDOM   Result Value Ref Range    Cholesterol 221 (H) <=199 mg/dL    Triglycerides 135 <=149 mg/dL    HDL Cholesterol 57 >=50 mg/dL    LDL Calculated 137 (H) <=129 mg/dL    Patient Fasting > 8hrs? Yes        Harsh Zamor[place]ia,    It was very nice to meet you in clinic. Your lab results have returned.     1. Your thyroid level is normal.     2. Your kidney function, electrolytes, and glucose are in the normal range.     3. Your blood counts are in the normal range.     4. Your total cholesterol is mildly elevated with mild elevation in the LDL (bad) cholesterol. Your HDL (good) cholesterol level is good. Continue working on diet and lifestyle  modifications to help with these numbers.     I look forward to seeing you again. Please let me know if you have any questions or concerns.     Please call with questions or contact us using [email or web address].    Sincerely,        Electronically signed by Betty Awad, DO

## 2021-06-18 NOTE — LETTER
Letter by Andrea Valenzuela MD at      Author: Andrea Valenzuela MD Service: -- Author Type: --    Filed:  Encounter Date: 3/7/2019 Status: (Other)           [facility]    [facility]  [address] [place] [address]  [phone]    [facility]  [address], [address]  [place] [address]  [phone]    03/07/19    Betty Awad DO  [address] [address] [address]    Patient: Elizabeth Barkley  MR Number: [id number]  YOB: 1985  Date of Visit: 3/7/2019      Dear Dr. Awad    Thank you for referring Elizabeth Barkley to me for evaluation.  Please see attached visit note.  If you have questions, please do not hesitate to contact me.       Sincerely,    Andrea Valenzuela MD  [facility]  [phone]  [email or web address]                    [email or web address]              Assessment:    Allergic rhinitis with specific sensitivity to tree pollen (spring), weed pollen (late summer- freeze), mold, cat and dog  Persistent asthma with exercise and allergy trigger.  Normal spirometry and elevated nitric oxide testing.  History of bee sting allergy with large local reaction and shortness of breath.    Plan:    Environmental measures  Medication: Continue montelukast 10 mg daily  Recommend starting fluticasone nasal spray 2 spray each nostril daily.  Continue albuterol 2 puffs every 4 hours.  Consider pretreating 20 minutes before exercise  Consider a daily controller inhaler such as Qvar.  Recommend having an EpiPen available however testing could be done for hymenoptera venom allergy.  EpiPen prescribed.  Described proper use.    Follow-up in 6 weeks.  Discussed doing hymenoptera venom testing at that time.  She should be off all antihistamines.    Consider allergy shots.  Information given.  ____________________________________________________________________________     Patient is here for allergy evaluation.  She reports itchy  watery eyes, rhinorrhea, nasal congestion, shortness of breath.  She gets contact hives around pets.  4 years ago she has significant events around pets in a new home.  She has shortness of breath.  No hives with this episode.  Patient was evaluated and given epinephrine device to carry at that time.  She has a history of asthma that is triggered by exercise and allergies.  Cold air and illness do not seem to trigger.  No history of hospitalizations.  No prednisone for asthma.  Patient was taking Claritin regularly.  This past week she was started on montelukast.  She has albuterol which she uses as needed.  She has a history of a large local reaction with bee sting.  And her last episode 3 years ago she felt short of breath after this.    Review of symptoms:  As above, otherwise negative    Past medical history: History of migraine headaches.  Tonsillectomy and adenoidectomy.  No other chronic medical conditions noted.    Allergies: No known allergies to medications, latex, or foods     Family history: Mother with allergies.    Social history: Currently lives in a townhouse.  At approximately 8 years old and she has been in it for 2 years.  No visible water seepage or mold.  She has had a dog in this home since fall 2018.  Prior to that had been dog free for 2 years.  She does smoke cigarettes.  She smokes 1 pack/day over the past 16 years.  She works as a .  She is been in current location for 1 year.    Medications: reviewed in chart    Physical Exam:  General:  Alert and in no apparent distress.  Eyes:  Sclera clear.  Ears: TMs translucent grey with bony landmarks visible. Nose: Pale, boggy mucosal membranes.  Throat: Pink, moist.  No lesions.  Neck: Supple.  No lymphadenopathy.  Lungs: CTA.  CV: Regular rate and rhythm. Extremities: Well perfused.  No clubbing or cyanosis. Skin: No rash    Spirometry: FEV1 to FVC ratio is 81%.  FEV1 is 3.12 L which is 91% predicted.  FVC is 3.85 L which is 93% of  predicted.  This is normal spirometry.    Nitric oxide 105 ppb    Last Percutaneous Allergy Test Results  Trees  Renard, White  1:20 H  (W/F in mm): 0/0 (03/07/19 0901)  Birch Mix 1:20 H (W/F in mm): 0/0 (03/07/19 0901)  Brooke, Common 1:20 H (W/F in mm): 0/0 (03/07/19 0901)  Elm, American 1:20 H (W/F in mm): 0/0 (03/07/19 0901)  [place], Shagbark 1:20 H (W/F in mm): 0/0 (03/07/19 0901)  Maple, Hard/Sugar 1:20 H (W/F in mm): 0/0 (03/07/19 0901)  [place] Mix 1:20 H (W/F in mm): 0/0 (03/07/19 0901)  Warriormine, Red 1:20 H (W/F in mm): 6/16 (03/07/19 0901)  Benedicta, American 1:20 H (W/F in mm): 0/0 (03/07/19 0901)  [place] Tree 1:20 H (W/F in mm): 0/0 (03/07/19 0901)  Dust Mites  D. Pteronyssinus Mite 30,000 AU/ML H (W/F in mm): 0/0 (03/07/19 0901)  D. Farinae Mite 30,000 AU/ML H (W/F in mm: 0/0 (03/07/19 0901)  Grasses  Grass Mix #4 10,000 BAU/ML H: 0/0 (03/07/19 0901)  Jeremy Grass 1:20 H (W/F in mm): 0/0 (03/07/19 0901)  Cockroach  Cockroach Mix 1:10 H (W/F in mm): 0/0 (03/07/19 0901)  Molds/Fungi  Alternaria Tenuis 1:10 H (W/F in mm): 4/F (03/07/19 0901)  Aspergillus Fumigatus 1:10 H (W/F in mm): 0/0 (03/07/19 0901)  Homodendrum Cladosporioides 1:10 H (W/F in mm): 0/0 (03/07/19 0901)  Penicillin Notatum 1:10 H (W/F in mm): 0/0 (03/07/19 0901)  Epicoccum 1:10 H (W/F in mm): 0/0 (03/07/19 0901)  Weeds  Ragweed, Short 1:20 H (W/F in mm): 12/28 (03/07/19 0901)  Dock, Sorrel 1:20 H (W/F in mm): 0/0 (03/07/19 0901)  Lamb's Quarter 1:20 H (W/F in mm): 0/0 (03/07/19 0901)  Pigweed, Rough Red Root 1:20 H  (W/F in mm): 0/0 (03/07/19 0901)  Plantain, English 1:20 H  (W/F in mm): 0/0 (03/07/19 0901)  Sagebrush, Mugwort 1:20 H  (W/F in mm): 0/0 (03/07/19 0901)  Animal  Cat 10,000 BAU/ML H (W/F in mm): 4/F (03/07/19 0901)  Dog 1:10 H (W/F in mm): 4/F (03/07/19 0901)  Controls  Device Type: QUINTIP (03/07/19 0901)  Neg. control: 50% Glycerine/Saline H (W/F in mm): 0/0 (03/07/19 0901)  Pos. control: Histamine 6mg/ML (W/F in mms): 6/F  (03/07/19 0901)     Patient seen upon request of Dr. Betty Awad for evaluation of allergies and asthma.

## 2021-06-20 NOTE — LETTER
Letter by Betty Awad DO at      Author: Betty Awad DO Service: -- Author Type: --    Filed:  Encounter Date: 1/2/2020 Status: Signed       Elizabeth Barkley  [address]  [facility] [address]         January 2, 2020    Dear Elizabeth    In reviewing your records, we have determined a gap in your preventive services. Based on your age and health history, we recommend the follow service.     ? General Physical  ? Physical with a Pap Smear  ? Colon cancer screening  ? Mammogram  ? Immunization  ? Diabetic check  ? Blood pressure/cardiovascular check  ? Asthma check  ? Cholesterol test  ? Lab work  ? Med check    If you have had the service elsewhere, please contact us so we can update our records. Please let us know if you have transferred your care to another clinic.    Please call [phone] to schedule this appointment.    We believe that a strong preventive care program, including regular physicals and follow-up care is an important part of a healthy lifestyle and we are committed to helping you maintain your health.    Thank you for choosing us as your health care provider.    Sincerely,   [facility]  [id number] [place] [address]  Phone Number:  [phone]

## 2021-06-24 NOTE — PATIENT INSTRUCTIONS - HE
"Weight Goals: goal to lose 7% of weight for health, approximately 15 pounds.   Goal to lose 1 pound per week, deficit of 500 calories per day, combination of exercise and decreased intake.   Exercise goal, to eventually get to 150 minutes per week (or 30 minutes 5 times a week). Find something you like, ideally that can do as a family.    I recommend checking out the website \"My Fitness Pal\" to track calories and find recommendations for intake.   "

## 2021-06-24 NOTE — TELEPHONE ENCOUNTER
----- Message from Betty Awad DO sent at 3/8/2019  8:53 AM CST -----  Please call Elizabeth with her pap smear results and assist with referral to OBGYN. I have placed referral. Thank you!    Harsh Elizabeth,  Your pap smear results have returned, and it does show changes that need follow up. 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 The cells are showing high grade changes.   I recommend sending you to OBGYN for next steps. Referral order has been placed. We frequently use either [facility] or [facility], but if you would prefer a different OBGYN, that is fine too.  Please let me know if you have any questions or concerns.   Betty Awad, DO

## 2021-06-24 NOTE — PROGRESS NOTES
[facility] Clinic Note    Elizabeth Barkley  1985   [id number]    Elizabeth Barkley is a 34 y.o. female presenting to discuss the following:     CC:   Chief Complaint   Patient presents with     Annual Exam     Pap - not fasting (had a few sips of coffee with half & half, and sugar free vanilla syrup)     Allergies     Dog allergy     Asthma     updated prescription for inhaler - Albuterol Inhaler     Migraine     updated prescription for migraines - Sumatriptan 100mg     Weight     Discuss weight loss       HPI:    ASTHMA -   - Diagnosed with asthma about 4 years ago. Went into anaphylactic shock. Allergic to cats and dogs. Couldn't breathe. Did all the tests. Used albuterol inhaler, doesn't need very often, needs a refill.   - Has never had a controller inhaler.   - ACT: 18/25 / no hospitalizations or ED visits.      ALLERGIES -   - Allergic to pollen, flowers, cats, dogs.   - Has a dog at home, is resident hotel dog, with at work and at home  - Has [email or web address]  - Using Claritin melting tablets, does help symptoms  - Has not seen an allergist.   - Thinks she would use albuterol maybe monthly     MIGRAINES -  - History of migraine headaches, depends on stress level  - Worse going into period and week of period  - Does have auras, describes as scary  - Using sumatriptan 100mg for a while, works well   - Used 4 doses in February     WEIGHT -   Started the keto diet last year  - Wondering if healthy option  - Wants to work out wondering good option     PAP SMEARS -   - When pregnant with daughter, was told had something, HPV 7 years ago  - Retested after she was born, then was fine     HEALTH MAINTENANCE -   - Declines flu shot  - Likely had tetanus booster when pregnant, daughter is now 7       Healthy Habits  Do you typically exercising at least 40 min, 3-4 times per week?  NO  Do you usually eat at least 4 servings of fruit and vegetables a day, include whole grains and fiber and avoid regularly eating high fat  foods? NO - doesn't like to eat in the morning  Have you had an eye exam in the past two years? Yes  Do you see a dentist twice per year? NO  Do you have any concerns regarding sleep? No    Safety Screen  If you own firearms, are they secured in a locked gun cabinet or with trigger locks? Yes  Do you feel you are safe where you are living?: Yes (2/27/2019  7:24 AM)  Do you feel you are safe in your relationship(s)?: Yes (2/27/2019  7:24 AM)    ROS:   14 point ROS negative except for as reported above in HPI.     PMH:   Patient Active Problem List   Diagnosis     Allergic rhinitis due to animal hair and dander     Migraine with aura and without status migrainosus, not intractable     Mild intermittent asthma without complication     PSH:   Past Surgical History:   Procedure Laterality Date     LAPAROSCOPIC CHOLECYSTECTOMY  2013     TONSILLECTOMY AND ADENOIDECTOMY         MEDICATIONS:   Current Outpatient Medications on File Prior to Visit   Medication Sig Dispense Refill     loratadine (CLARITIN ORAL) Take by mouth.       No current facility-administered medications on file prior to visit.        ALLERGIES:  Allergies   Allergen Reactions     Cat Dander Hives and Shortness Of Breath     Dog Dander Hives and Shortness Of Breath     Flowers Shortness Of Breath     Pollen Extracts Shortness Of Breath       FAMHx:  family history includes Anxiety disorder in her father and mother; Diabetes in her maternal grandmother; Gallbladder disease in her sister; Heart disease in her maternal grandfather; Hypertension in her father and mother; Migraines in her brother and sister.    SOCIAL HISTORY:   Social History     Socioeconomic History     Marital status:      Spouse name: Tony     Number of children: 1     Years of education: Not on file     Highest education level: Not on file   Occupational History     Occupation:      Comment: [employer]   Tobacco Use     Smoking status: Current Every Day Smoker      "Packs/day: 1.00     Years: 12.00     Pack years: 12.00     Smokeless tobacco: Never Used   Substance and Sexual Activity     Alcohol use: Yes     Comment: Social     Drug use: No     Sexual activity: Yes     Partners: Male     Birth control/protection: None     PHYSICAL EXAM:   /78   Pulse 76   Ht 5' 6.5\" (1.689 m)   Wt 199 lb 11.2 oz (90.6 kg)   LMP 02/24/2019   BMI 31.75 kg/m     GENERAL: Elizabeth is a pleasant, obese female, in no acute distress.   HEENT: Sclera white, EOMI, PERRL, normal tympanic membranes, no nasal discharge, oropharynx pink and moist, no cervical lymphadenopathy, no goiter, no thyromegaly or nodules.   HEART: regular rate and rhythm, no murmurs.  LUNGS: Clear to auscultation bilaterally, unlabored.   ABDOMEN: Soft, non-tender to palpation, no organomegaly.   GYN: no external genitalia lesions, physiologic discharge, normal cervix without lesions  MSK: no gross deformities   NEURO: grossly intact, speech intact, face symmetrical, moving extremities without difficulty, gait intact   PSYCH: Mood is good, affect normal, appropriately groomed.   DERM: Scattered benign appearing nevi.     ASSESSMENT & PLAN:   Elizabeth Barkley is a 34 y.o. female presenting today for annual physical exam and to establish care and for general physical exam.     1. Annual physical exam  2. Encounter to establish care  Elizabeth is a pleasant obese female presenting today to establish care.  We reviewed her past history and have updated epic chart.  Additionally reviewed health maintenance.  She is unsure when her last tetanus immunization was, but presumes it was during her last pregnancy approximately 7 years ago.  She declines flu shot or tetanus booster today.  Discussed that if she does get a cut, she should present for her tetanus booster.  Otherwise, we will plan to repeat tetanus booster in 3 years and presumably she is due.    I discussed the following with the patient:   Adult Healthy Living: Importance of " regular exercise  Healthy nutrition  Getting adequate sleep  Stress management  Firearm safety    3. Mild intermittent asthma without complication  4. Allergic rhinitis due to animal hair and dander  - albuterol (PROAIR HFA;PROVENTIL HFA;VENTOLIN HFA) 90 mcg/actuation inhaler; Inhale 2 puffs every 6 (six) hours as needed for wheezing.  Dispense: 1 each; Refill: 6  - montelukast (SINGULAIR) 10 mg tablet; Take 1 tablet (10 mg total) by mouth at bedtime.  Dispense: 90 tablet; Refill: 3  - Ambulatory referral to Allergy    History of asthma, ACT today is 18 of 25, she does not have an albuterol inhaler currently, however think she would only use it once or twice a month if she did have it.  Items are significantly correlated to allergic symptoms.    Renewed albuterol inhaler.  Discussed option of starting Singulair which will likely be beneficial for both her breathing and her allergy symptoms.  Additionally, given strong allergic component, recommended referral to the allergist for consideration of allergy shots.  Continue daily antihistamine.    5. Migraine with aura and without status migrainosus, not intractable  - SUMAtriptan (IMITREX) 100 MG tablet; Take 1 tablet (100 mg total) by mouth once as needed for migraine. No more than 9 tablets per month.  Dispense: 30 tablet; Refill: 2    History of migraine headaches, successfully managed with sumatriptan 100 mg tablets.  Blood pressure is in normal range.  Renewed prescription.  Discussed risk of rebound headaches if over use of sumatriptan.  If headaches becoming more frequent, can discuss prophylactic therapy.    6. Class 1 obesity due to excess calories without serious comorbidity with body mass index (BMI) of 31.0 to 31.9 in adult  - Thyroid [place]  - Basic Metabolic Panel  - Lipid Cascade RANDOM    Elizabeth's BMI is in the obese category.  We discussed recommendation of weight loss of 7% total body weight, or approximately 15 pounds.  Discussed diet and lifestyle  modifications to help make this happen.  Recommend weight loss of 1 pound per week with daily deficit of 500 melanie.    Recommended 150 minutes of exercise per week.  Recommended she look into my fitness pal website or hansa for tracking calories.    We briefly discussed the ketogenic diet and intermittent fasting.  Discussed recent study showing increased mortality with ketogenic diet.  Discussed trying to limit simple carbohydrates, but not completely cutting out carbohydrates, making sure carbohydrates are coming from vegetables and fruits.    Will evaluate for elevated glucose (nonfasting), dyslipidemia, and thyroid abnormalities that may be contributing to weight management issues.    7. Menorrhagia with regular cycle  - HM2(CBC w/o Differential)    Elizabeth reports chronic heavy periods.  We will also obtain hemoglobin and MCV to evaluate for anemia.    8. Cervical cancer screening  - Gynecologic Cytology (PAP Smear)    Overdue for Pap smear, reports abnormal Pap smear in the past, but unsure if it was just HPV or if there were cell changes.  Reports last Pap smear had normalized.     9. Tobacco Use  Briefly discussed tobacco use, recommended smoking cessation.  Patient is not ready to quit smoking yet.    RTC:   May 2019 - follow up asthma and weight management  February 2020 - annual physical exam     Betty Awad DO

## 2021-06-24 NOTE — PATIENT INSTRUCTIONS - HE
Assessment:    Allergic rhinitis with specific sensitivity to tree pollen (spring), weed pollen (late summer- freeze), mold, cat and dog  Persistent asthma with exercise and allergy trigger.  Normal spirometry and elevated nitric oxide testing.  History of bee sting allergy with large local reaction and shortness of breath.    Plan:    Environmental measures  Medication: Continue montelukast 10 mg daily  Recommend starting fluticasone nasal spray 2 spray each nostril daily.  Continue albuterol 2 puffs every 4 hours.  Consider pretreating 20 minutes before exercise  Consider a daily controller inhaler such as Qvar.  Recommend having an EpiPen available however testing could be done for hymenoptera venom allergy.    Follow-up in 6 weeks.    Consider allergy shots

## 2021-06-24 NOTE — TELEPHONE ENCOUNTER
Patient Returning Call  Reason for call:  Returning phone call  Information relayed to patient: Below message relayed to patient. Patient states she does not have a preference as to which OBGYN provider she sees.  Patient has additional questions:  No  If YES, what are your questions/concerns:  No additional questions at this time.  Okay to leave a detailed message?: No

## 2021-06-24 NOTE — TELEPHONE ENCOUNTER
Spoke to pt, phone number given to [facility] ([phone])  Faxed referral and pap results to [phone].

## 2021-06-24 NOTE — PROGRESS NOTES
Assessment:    Allergic rhinitis with specific sensitivity to tree pollen (spring), weed pollen (late summer- freeze), mold, cat and dog  Persistent asthma with exercise and allergy trigger.  Normal spirometry and elevated nitric oxide testing.  History of bee sting allergy with large local reaction and shortness of breath.    Plan:    Environmental measures  Medication: Continue montelukast 10 mg daily  Recommend starting fluticasone nasal spray 2 spray each nostril daily.  Continue albuterol 2 puffs every 4 hours.  Consider pretreating 20 minutes before exercise  Consider a daily controller inhaler such as Qvar.  Recommend having an EpiPen available however testing could be done for hymenoptera venom allergy.  EpiPen prescribed.  Described proper use.    Follow-up in 6 weeks.  Discussed doing hymenoptera venom testing at that time.  She should be off all antihistamines.    Consider allergy shots.  Information given.  ____________________________________________________________________________     Patient is here for allergy evaluation.  She reports itchy watery eyes, rhinorrhea, nasal congestion, shortness of breath.  She gets contact hives around pets.  4 years ago she has significant events around pets in a new home.  She has shortness of breath.  No hives with this episode.  Patient was evaluated and given epinephrine device to carry at that time.  She has a history of asthma that is triggered by exercise and allergies.  Cold air and illness do not seem to trigger.  No history of hospitalizations.  No prednisone for asthma.  Patient was taking Claritin regularly.  This past week she was started on montelukast.  She has albuterol which she uses as needed.  She has a history of a large local reaction with bee sting.  And her last episode 3 years ago she felt short of breath after this.    Review of symptoms:  As above, otherwise negative    Past medical history: History of migraine headaches.  Tonsillectomy and  adenoidectomy.  No other chronic medical conditions noted.    Allergies: No known allergies to medications, latex, or foods     Family history: Mother with allergies.    Social history: Currently lives in a townhouse.  At approximately 8 years old and she has been in it for 2 years.  No visible water seepage or mold.  She has had a dog in this home since fall 2018.  Prior to that had been dog free for 2 years.  She does smoke cigarettes.  She smokes 1 pack/day over the past 16 years.  She works as a .  She is been in current location for 1 year.    Medications: reviewed in chart    Physical Exam:  General:  Alert and in no apparent distress.  Eyes:  Sclera clear.  Ears: TMs translucent grey with bony landmarks visible. Nose: Pale, boggy mucosal membranes.  Throat: Pink, moist.  No lesions.  Neck: Supple.  No lymphadenopathy.  Lungs: CTA.  CV: Regular rate and rhythm. Extremities: Well perfused.  No clubbing or cyanosis. Skin: No rash    Spirometry: FEV1 to FVC ratio is 81%.  FEV1 is 3.12 L which is 91% predicted.  FVC is 3.85 L which is 93% of predicted.  This is normal spirometry.    Nitric oxide 105 ppb    Last Percutaneous Allergy Test Results  Trees  Renard, White  1:20 H  (W/F in mm): 0/0 (03/07/19 0901)  Birch Mix 1:20 H (W/F in mm): 0/0 (03/07/19 0901)  [place], Common 1:20 H (W/F in mm): 0/0 (03/07/19 0901)  Elm, American 1:20 H (W/F in mm): 0/0 (03/07/19 0901)  Grafton, Shagbark 1:20 H (W/F in mm): 0/0 (03/07/19 0901)  Maple, Hard/Sugar 1:20 H (W/F in mm): 0/0 (03/07/19 0901)  Sunnyvale Mix 1:20 H (W/F in mm): 0/0 (03/07/19 0901)  [place], Red 1:20 H (W/F in mm): 6/16 (03/07/19 0901)  Big Timber, American 1:20 H (W/F in mm): 0/0 (03/07/19 0901)  Harper Tree 1:20 H (W/F in mm): 0/0 (03/07/19 0901)  Dust Mites  D. Pteronyssinus Mite 30,000 AU/ML H (W/F in mm): 0/0 (03/07/19 0901)  D. Farinae Mite 30,000 AU/ML H (W/F in mm: 0/0 (03/07/19 0901)  Grasses  Grass Mix #4 10,000 BAU/ML H: 0/0 (03/07/19  0901)  Jeremy Grass 1:20 H (W/F in mm): 0/0 (03/07/19 0901)  Cockroach  Cockroach Mix 1:10 H (W/F in mm): 0/0 (03/07/19 0901)  Molds/Fungi  Alternaria Tenuis 1:10 H (W/F in mm): 4/F (03/07/19 0901)  Aspergillus Fumigatus 1:10 H (W/F in mm): 0/0 (03/07/19 0901)  Homodendrum Cladosporioides 1:10 H (W/F in mm): 0/0 (03/07/19 0901)  Penicillin Notatum 1:10 H (W/F in mm): 0/0 (03/07/19 0901)  Epicoccum 1:10 H (W/F in mm): 0/0 (03/07/19 0901)  Weeds  Ragweed, Short 1:20 H (W/F in mm): 12/28 (03/07/19 0901)  Dock, Sorrel 1:20 H (W/F in mm): 0/0 (03/07/19 0901)  Lamb's Quarter 1:20 H (W/F in mm): 0/0 (03/07/19 0901)  Pigweed, Rough Red Root 1:20 H  (W/F in mm): 0/0 (03/07/19 0901)  Plantain, English 1:20 H  (W/F in mm): 0/0 (03/07/19 0901)  Sagebrush, Mugwort 1:20 H  (W/F in mm): 0/0 (03/07/19 0901)  Animal  Cat 10,000 BAU/ML H (W/F in mm): 4/F (03/07/19 0901)  Dog 1:10 H (W/F in mm): 4/F (03/07/19 0901)  Controls  Device Type: QUINTIP (03/07/19 0901)  Neg. control: 50% Glycerine/Saline H (W/F in mm): 0/0 (03/07/19 0901)  Pos. control: Histamine 6mg/ML (W/F in mms): 6/F (03/07/19 0901)     Patient seen upon request of Dr. Betty Awad for evaluation of allergies and asthma.

## 2021-06-24 NOTE — PROGRESS NOTES
Please call Elizabeth with her pap smear results and assist with referral to OBGYN. I have placed referral. Thank you!    Harsh Johnson,  Your pap smear results have returned, and it does show changes that need follow up. 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 The cells are showing high grade changes.   I recommend sending you to OBGYN for next steps. Referral order has been placed. We frequently use either [facility] or [facility], but if you would prefer a different OBGYN, that is fine too.  Please let me know if you have any questions or concerns.   Betty Awad, DO    12. HSIL on Pap smear of cervix and positive for high risk HPV  - Ambulatory referral to Obstetrics / Gynecology

## 2021-06-24 NOTE — TELEPHONE ENCOUNTER
Left message to call back for: lab results  Information to relay to patient:   MD message below.  Please document if pt would like to go to [facility] or [facility], then we can send lab results to that clinic for her appt.

## 2021-07-03 NOTE — ADDENDUM NOTE
Addendum Note by Carlin Awad DO at 2/27/2019  7:20 AM     Author: Carlin Awad DO Service: -- Author Type: Physician    Filed: 3/8/2019  8:57 AM Encounter Date: 2/27/2019 Status: Signed    : Carlin Awad DO (Physician)    Addended by: CARLIN AWAD on: 3/8/2019 08:57 AM        Modules accepted: Orders

## 2021-10-09 ENCOUNTER — HEALTH MAINTENANCE LETTER (OUTPATIENT)
Age: 36
End: 2021-10-09

## 2022-01-14 ENCOUNTER — PATIENT OUTREACH (OUTPATIENT)
Dept: FAMILY MEDICINE | Facility: CLINIC | Age: 37
End: 2022-01-14
Payer: COMMERCIAL

## 2022-01-14 PROBLEM — R87.613 HSIL ON PAP SMEAR OF CERVIX: Status: ACTIVE | Noted: 2019-03-08

## 2022-01-14 NOTE — LETTER
January 14, 2022      Elizabeth Barkley  [id number]  [place] [address]        Dear ,    This letter is to remind you that you are due for your follow-up Pap smear and Human Papillomavirus (HPV) test.    Please call [phone] to schedule your appointment at your earliest convenience.    If you have completed the appointment outside of the [facility] system, please have the records forwarded to our office. We will update your chart for your provider to review before your next annual wellness visit.     Thank you for choosing [facility]!      Sincerely,    Your [facility] Care Team

## 2022-01-18 VITALS
SYSTOLIC BLOOD PRESSURE: 121 MMHG | HEART RATE: 88 BPM | DIASTOLIC BLOOD PRESSURE: 80 MMHG | WEIGHT: 214 LBS | HEIGHT: 66 IN | BODY MASS INDEX: 34.39 KG/M2 | TEMPERATURE: 97.7 F

## 2022-01-18 ASSESSMENT — PATIENT HEALTH QUESTIONNAIRE - PHQ9: SUM OF ALL RESPONSES TO PHQ QUESTIONS 1-9: 7

## 2022-01-19 ASSESSMENT — ASTHMA QUESTIONNAIRES: ACT_TOTALSCORE: 23

## 2022-01-26 ENCOUNTER — E-VISIT (OUTPATIENT)
Dept: FAMILY MEDICINE | Facility: CLINIC | Age: 37
End: 2022-01-26
Payer: COMMERCIAL

## 2022-01-26 DIAGNOSIS — L81.9 HYPOPIGMENTATION: Primary | ICD-10-CM

## 2022-01-26 PROCEDURE — 99207 PR NON-BILLABLE SERV PER CHARTING: CPT | Performed by: FAMILY MEDICINE

## 2022-01-27 NOTE — TELEPHONE ENCOUNTER
Provider E-Visit time total (minutes): n/a  She should reach out to treating provider with this concern.    Betty Awad, DO

## 2022-03-04 ENCOUNTER — OFFICE VISIT (OUTPATIENT)
Dept: FAMILY MEDICINE | Facility: CLINIC | Age: 37
End: 2022-03-04
Payer: COMMERCIAL

## 2022-03-04 VITALS
DIASTOLIC BLOOD PRESSURE: 73 MMHG | HEART RATE: 90 BPM | HEIGHT: 66 IN | WEIGHT: 229.13 LBS | BODY MASS INDEX: 36.82 KG/M2 | SYSTOLIC BLOOD PRESSURE: 122 MMHG

## 2022-03-04 DIAGNOSIS — E66.09 CLASS 2 OBESITY DUE TO EXCESS CALORIES WITHOUT SERIOUS COMORBIDITY WITH BODY MASS INDEX (BMI) OF 36.0 TO 36.9 IN ADULT: ICD-10-CM

## 2022-03-04 DIAGNOSIS — N94.6 DYSMENORRHEA: ICD-10-CM

## 2022-03-04 DIAGNOSIS — E66.812 CLASS 2 OBESITY DUE TO EXCESS CALORIES WITHOUT SERIOUS COMORBIDITY WITH BODY MASS INDEX (BMI) OF 36.0 TO 36.9 IN ADULT: ICD-10-CM

## 2022-03-04 DIAGNOSIS — J45.20 MILD INTERMITTENT ASTHMA WITHOUT COMPLICATION: ICD-10-CM

## 2022-03-04 DIAGNOSIS — L81.9 HYPOPIGMENTATION: ICD-10-CM

## 2022-03-04 DIAGNOSIS — Z87.42 HX OF ABNORMAL CERVICAL PAP SMEAR: ICD-10-CM

## 2022-03-04 DIAGNOSIS — G43.109 MIGRAINE WITH AURA AND WITHOUT STATUS MIGRAINOSUS, NOT INTRACTABLE: ICD-10-CM

## 2022-03-04 DIAGNOSIS — Z00.00 ANNUAL PHYSICAL EXAM: Primary | ICD-10-CM

## 2022-03-04 PROBLEM — F41.9 ANXIETY: Status: RESOLVED | Noted: 2017-05-01 | Resolved: 2022-03-04

## 2022-03-04 PROBLEM — Z72.0 TOBACCO USE: Status: ACTIVE | Noted: 2019-02-27

## 2022-03-04 PROBLEM — Z91.030 ALLERGY TO HONEY BEE VENOM: Status: ACTIVE | Noted: 2021-02-02

## 2022-03-04 PROBLEM — N60.12 FIBROCYSTIC BREAST CHANGES OF BOTH BREASTS: Status: ACTIVE | Noted: 2021-02-02

## 2022-03-04 PROBLEM — K64.4 RESIDUAL HEMORRHOIDAL SKIN TAGS: Status: ACTIVE | Noted: 2021-02-02

## 2022-03-04 PROBLEM — N60.11 FIBROCYSTIC BREAST CHANGES OF BOTH BREASTS: Status: ACTIVE | Noted: 2021-02-02

## 2022-03-04 PROCEDURE — 87624 HPV HI-RISK TYP POOLED RSLT: CPT | Performed by: FAMILY MEDICINE

## 2022-03-04 PROCEDURE — 99213 OFFICE O/P EST LOW 20 MIN: CPT | Mod: 25 | Performed by: FAMILY MEDICINE

## 2022-03-04 PROCEDURE — 99395 PREV VISIT EST AGE 18-39: CPT | Performed by: FAMILY MEDICINE

## 2022-03-04 PROCEDURE — G0123 SCREEN CERV/VAG THIN LAYER: HCPCS | Performed by: FAMILY MEDICINE

## 2022-03-04 RX ORDER — SUMATRIPTAN 100 MG/1
TABLET, FILM COATED ORAL
COMMUNITY
Start: 2021-12-21 | End: 2022-03-04

## 2022-03-04 RX ORDER — EPINEPHRINE 0.3 MG/.3ML
0.3 INJECTION SUBCUTANEOUS
COMMUNITY
Start: 2021-02-02

## 2022-03-04 RX ORDER — ALBUTEROL SULFATE 90 UG/1
2 AEROSOL, METERED RESPIRATORY (INHALATION) EVERY 6 HOURS PRN
Qty: 19 G | Refills: 4 | Status: SHIPPED | OUTPATIENT
Start: 2022-03-04 | End: 2024-02-19

## 2022-03-04 RX ORDER — ACETAMINOPHEN AND CODEINE PHOSPHATE 120; 12 MG/5ML; MG/5ML
0.35 SOLUTION ORAL DAILY
Qty: 90 TABLET | Refills: 3 | Status: SHIPPED | OUTPATIENT
Start: 2022-03-04 | End: 2023-01-30

## 2022-03-04 RX ORDER — NAPROXEN 500 MG/1
500 TABLET ORAL 2 TIMES DAILY PRN
Qty: 60 TABLET | Refills: 0 | Status: SHIPPED | OUTPATIENT
Start: 2022-03-04

## 2022-03-04 RX ORDER — SUMATRIPTAN 100 MG/1
100 TABLET, FILM COATED ORAL
Qty: 18 TABLET | Refills: 4 | Status: SHIPPED | OUTPATIENT
Start: 2022-03-04 | End: 2023-03-27

## 2022-03-04 RX ORDER — LORATADINE 10 MG/1
10 TABLET ORAL
COMMUNITY

## 2022-03-04 ASSESSMENT — ASTHMA QUESTIONNAIRES
QUESTION_4 LAST FOUR WEEKS HOW OFTEN HAVE YOU USED YOUR RESCUE INHALER OR NEBULIZER MEDICATION (SUCH AS ALBUTEROL): NOT AT ALL
QUESTION_5 LAST FOUR WEEKS HOW WOULD YOU RATE YOUR ASTHMA CONTROL: WELL CONTROLLED
ACT_TOTALSCORE: 24
QUESTION_3 LAST FOUR WEEKS HOW OFTEN DID YOUR ASTHMA SYMPTOMS (WHEEZING, COUGHING, SHORTNESS OF BREATH, CHEST TIGHTNESS OR PAIN) WAKE YOU UP AT NIGHT OR EARLIER THAN USUAL IN THE MORNING: NOT AT ALL
QUESTION_1 LAST FOUR WEEKS HOW MUCH OF THE TIME DID YOUR ASTHMA KEEP YOU FROM GETTING AS MUCH DONE AT WORK, SCHOOL OR AT HOME: NONE OF THE TIME
QUESTION_2 LAST FOUR WEEKS HOW OFTEN HAVE YOU HAD SHORTNESS OF BREATH: NOT AT ALL
ACT_TOTALSCORE: 24

## 2022-03-04 NOTE — PROGRESS NOTES
SUBJECTIVE:   CC: Elizabeth Barkley is an 37 year old woman who presents for preventive health visit.     Chief Complaint   Patient presents with     Physical        Patient has been advised of split billing requirements and indicates understanding: Yes  Healthy Habits:     Getting at least 3 servings of Calcium per day:  NO    Bi-annual eye exam:  Yes    Dental care twice a year:  Yes    Sleep apnea or symptoms of sleep apnea:  Excessive snoring    Diet:  Regular (no restrictions)    Frequency of exercise:  None    Taking medications regularly:  Not Applicable    Medication side effects:  Not applicable    PHQ-2 Total Score: 0    Additional concerns today:  No    Periods bad since her covid vaccines. Needed a PTO day in [employer] for work. Cramps in abdomen and stomach are bad.   Used Tylenol. Helped after a few hours.     Migraines - previously had the auras, nothing in a long time. Related to menstrual cycle. Eyes hurt, can't wear contacts.     STOPBAN, low risk. Family history of sleep apnea.    Today's PHQ-2 Score:   PHQ-2 (  [employer]) 3/4/2022   Q1: Little interest or pleasure in doing things 0   Q2: Feeling down, depressed or hopeless 0   PHQ-2 Score 0   Q1: Little interest or pleasure in doing things Not at all   Q2: Feeling down, depressed or hopeless Not at all   PHQ-2 Score 0       Abuse: Current or Past (Physical, Sexual or Emotional) - No  Do you feel safe in your environment? Yes    Have you ever done Advance Care Planning? (For example, a Health Directive, POLST, or a discussion with a medical provider or your loved ones about your wishes): No, advance care planning information given to patient to review.  Advanced care planning was discussed at today's visit.    Social History     Tobacco Use     Smoking status: Current Every Day Smoker     Packs/day: 1.00     Years: 12.00     Pack years: 12.00     Types: Cigarettes     Smokeless tobacco: Never Used     Tobacco comment: 18 not ready to quit,  "delcined info.    Substance Use Topics     Alcohol use: Yes     Comment: Alcoholic Drinks/day: Social     Under 1 ppd. Vaping instead of smoking.    If you drink alcohol do you typically have >3 drinks per day or >7 drinks per week? No    Alcohol Use 3/4/2022   Prescreen: >3 drinks/day or >7 drinks/week? Not Applicable       Reviewed orders with patient.  Reviewed health maintenance and updated orders accordingly - Yes      Breast Cancer Screening:    Breast CA Risk Assessment (FHS-7) 3/4/2022   Do you have a family history of breast, colon, or ovarian cancer? No / Unknown       Patient under 40 years of age: Routine Mammogram Screening not recommended.   Pertinent mammograms are reviewed under the imaging tab.    History of abnormal Pap smear: post colp surveillance  PAP / HPV Latest Ref Rng & Units 2/2/2021 2/27/2019   PAP Negative for squamous intraepithelial lesion or malignancy. Negative for squamous intraepithelial lesion or malignancy  Electronically signed by Jocelin Last CT (ASCP) on 2/12/2021 at 10:36 AM   HSIL encompassing mod/severe dysplasia, CIS, CIN2, CIN3  Electronically signed by Keanu Abreu MD on 3/8/2019 at  8:37 AM  (A)   HPV16 NEG Negative Positive(A)   HPV18 NEG Negative Negative   HRHPV NEG Negative Negative     Reviewed and updated as needed this visit by clinical staff   Tobacco  Allergies  Meds              Reviewed and updated as needed this visit by Provider   Tobacco  Allergies  Meds  Problems  Med Hx  Surg Hx  Fam Hx             Review of Systems  10 point ROS negative except for as reported above.      OBJECTIVE:   /73 (BP Location: Left arm, Patient Position: Sitting, Cuff Size: Adult Regular)   Pulse 90   Ht 1.676 m (5' 6\")   Wt 103.9 kg (229 lb 2 oz)   LMP 02/26/2022 (Exact Date)   Breastfeeding No   BMI 36.98 kg/m    Physical Exam  GENERAL: Elizabeth is a pleasant, obese female, in no acute distress.  HEENT: Sclera white, tympanic membranes " normal bilaterally, no cervical lymphadenopathy. Hypopigmented patches below bilateral eyelids.  HEART: Regular rate and rhythm, no murmurs.  LUNGS: Clear to auscultation bilaterally, unlabored.   ABDOMEN: Soft, non-tender to palpation, no palpable masses.  GYN: No external genital lesions, physiologic discharge, cervix with slight color variation anterior cervix.   EXTREMITIES: No lower extremity edema, pulses intact.  PSYCH: Mood is good, normal affect, appropriately groomed.     ASSESSMENT/PLAN:     1. Annual physical exam  Reviewed health history and health maintenance recommendations.     2. Mild intermittent asthma without complication  - albuterol (PROAIR HFA/PROVENTIL HFA/VENTOLIN HFA) 108 (90 Base) MCG/ACT inhaler; Inhale 2 puffs into the lungs every 6 hours as needed for shortness of breath / dyspnea or wheezing  Dispense: 19 g; Refill: 4    ACT at goal. Continue albuterol PRN.     3. Migraine with aura and without status migrainosus, not intractable  - SUMAtriptan (IMITREX) 100 MG tablet; Take 1 tablet (100 mg) by mouth at onset of headache for migraine  Dispense: 18 tablet; Refill: 4    Headaches adequately controlled. Continue imitrex abortive therapy PRN.       4. Hypopigmentation  - Adult Dermatology Referral; Future    Recent procedure at a [facility]. Sounds like this was a laser treatment, not cryotherapy, per patient report.   Strongly encouraged patient to discuss side effect with treating provider.   Hypopigmentation may be a permanent side effect. Encouraged consult with derm to see if there are any treatments to help with hypopigmentation.    5. Dysmenorrhea  - norethindrone (MICRONOR) 0.35 MG tablet; Take 1 tablet (0.35 mg) by mouth daily  Dispense: 90 tablet; Refill: 3  - naproxen (NAPROSYN) 500 MG tablet; Take 1 tablet (500 mg) by mouth 2 times daily as needed for moderate pain  Dispense: 60 tablet; Refill: 0    Combined oral contraceptive contraindicated.   Trial mini pill + NSAIDS for symptom  "management.     6. Hx of abnormal cervical Pap smear  - Pap Screen with HPV - recommended age 30 - 65 years  - HPV High Risk Types DNA Cervical    Previous cone procedure. Pap team to provide follow up recommendations.     7. Class 2 obesity due to excess calories without serious comorbidity with body mass index (BMI) of 36.0 to 36.9 in adult  Continue to work on healthy lifestyle modifications.  Reassess next visit, consider referral for comprehensive weight management clinic.        COUNSELING:  Reviewed preventive health counseling, as reflected in patient instructions       Regular exercise       Alcohol Use       Family planning    Estimated body mass index is 36.98 kg/m  as calculated from the following:    Height as of this encounter: 1.676 m (5' 6\").    Weight as of this encounter: 103.9 kg (229 lb 2 oz).    Weight management plan: Discussed healthy diet and exercise guidelines    She reports that she has been smoking cigarettes. She has a 12.00 pack-year smoking history. She has never used smokeless tobacco.  Tobacco Cessation Action Plan:   Information offered: Patient not interested at this time      Counseling Resources:  ATP IV Guidelines  Pooled Cohorts Equation Calculator  Breast Cancer Risk Calculator  BRCA-Related Cancer Risk Assessment: FHS-7 Tool  FRAX Risk Assessment  ICSI Preventive Guidelines  Dietary Guidelines for Americans, 2010  USDA's MyPlate  ASA Prophylaxis  Lung CA Screening    Betty Awad, [facility]    "

## 2022-03-08 LAB
HUMAN PAPILLOMA VIRUS 16 DNA: NEGATIVE
HUMAN PAPILLOMA VIRUS 18 DNA: NEGATIVE
HUMAN PAPILLOMA VIRUS FINAL DIAGNOSIS: NORMAL
HUMAN PAPILLOMA VIRUS OTHER HR: NEGATIVE

## 2022-03-09 ENCOUNTER — TRANSFERRED RECORDS (OUTPATIENT)
Dept: HEALTH INFORMATION MANAGEMENT | Facility: CLINIC | Age: 37
End: 2022-03-09
Payer: COMMERCIAL

## 2022-03-11 LAB
BKR LAB AP GYN ADEQUACY: NORMAL
BKR LAB AP GYN INTERPRETATION: NORMAL
BKR LAB AP HPV REFLEX: NORMAL
BKR LAB AP LMP: NORMAL
BKR LAB AP PREVIOUS ABNL DX: NORMAL
BKR LAB AP PREVIOUS ABNORMAL: NORMAL
PATH REPORT.COMMENTS IMP SPEC: NORMAL
PATH REPORT.COMMENTS IMP SPEC: NORMAL
PATH REPORT.RELEVANT HX SPEC: NORMAL

## 2022-03-14 ENCOUNTER — PATIENT OUTREACH (OUTPATIENT)
Dept: FAMILY MEDICINE | Facility: CLINIC | Age: 37
End: 2022-03-14
Payer: COMMERCIAL

## 2022-03-14 NOTE — RESULT ENCOUNTER NOTE
Cc'd to provider as FYI only due to pap hx.  No response needed unless prefer a change in plan.      Normal pap/Neg HPV letter sent through [facility] results. Next pap smear and HPV due in 1 year.     Priscilla Black, RN, BSN  Pap Tracking Nurse

## 2022-04-01 ENCOUNTER — MYC MEDICAL ADVICE (OUTPATIENT)
Dept: FAMILY MEDICINE | Facility: CLINIC | Age: 37
End: 2022-04-01
Payer: COMMERCIAL

## 2022-04-01 DIAGNOSIS — B00.1 COLD SORE: Primary | ICD-10-CM

## 2022-04-01 RX ORDER — VALACYCLOVIR HYDROCHLORIDE 1 G/1
2000 TABLET, FILM COATED ORAL 2 TIMES DAILY
Qty: 4 TABLET | Refills: 5 | Status: SHIPPED | OUTPATIENT
Start: 2022-04-01 | End: 2023-03-27

## 2022-04-01 NOTE — TELEPHONE ENCOUNTER
1. Cold sore  - valACYclovir (VALTREX) 1000 mg tablet; Take 2 tablets (2,000 mg) by mouth 2 times daily for 1 day  Dispense: 4 tablet; Refill: 5    Betty Awad DO

## 2022-09-11 ENCOUNTER — HEALTH MAINTENANCE LETTER (OUTPATIENT)
Age: 37
End: 2022-09-11

## 2023-01-29 DIAGNOSIS — N94.6 DYSMENORRHEA: ICD-10-CM

## 2023-01-30 RX ORDER — ACETAMINOPHEN AND CODEINE PHOSPHATE 120; 12 MG/5ML; MG/5ML
SOLUTION ORAL
Qty: 84 TABLET | Refills: 0 | Status: SHIPPED | OUTPATIENT
Start: 2023-01-30 | End: 2024-02-19

## 2023-01-30 NOTE — TELEPHONE ENCOUNTER
"Routing refill request to provider for review/approval because:  Patient needs to be seen because:  Patient is listed as a current smoker    Last Written Prescription Date:  3/4/2022  Last Fill Quantity: 90,  # refills: 3   Last office visit provider:  3/4/2022     Requested Prescriptions   Pending Prescriptions Disp Refills     norethindrone (MICRONOR) 0.35 MG tablet [Pharmacy Med Name: NORETHINDRONE 0.35MG TABLETS 28S] 84 tablet      Sig: TAKE 1 TABLET(0.35 MG) BY MOUTH DAILY       Contraceptives Protocol Failed - 1/29/2023  2:45 PM        Failed - Patient is not a current smoker if age is 35 or older        Passed - Recent (12 mo) or future (30 days) visit within the authorizing provider's specialty     Patient has had an office visit with the authorizing provider or a provider within the authorizing providers department within the previous 12 mos or has a future within next 30 days. See \"Patient Info\" tab in inbasket, or \"Choose Columns\" in Meds & Orders section of the refill encounter.              Passed - Medication is active on med list        Passed - No active pregnancy on record        Passed - No positive pregnancy test in past 12 months             Chiara Ren RN 01/30/23 2:07 PM      "

## 2023-02-13 ENCOUNTER — PATIENT OUTREACH (OUTPATIENT)
Dept: FAMILY MEDICINE | Facility: CLINIC | Age: 38
End: 2023-02-13
Payer: COMMERCIAL

## 2023-02-13 NOTE — LETTER
February 13, 2023      Elizabeth Barkley  [address] [address]        Dear ,    This letter is to remind you that you are due for your follow-up Pap smear and Human Papillomavirus (HPV) test.    Please call [phone] to schedule your appointment at your earliest convenience.    If you have completed the appointment outside of the [facility] system, please have the records forwarded to our office. We will update your chart for your provider to review before your next annual wellness visit.     Thank you for choosing [facility]!      Sincerely,    Your [facility] Care Team

## 2023-03-13 NOTE — TELEPHONE ENCOUNTER
Patient due for Pap and HPV.    Reminder done today via telephone call-spoke to the pt, phone number given.

## 2023-03-27 ENCOUNTER — OFFICE VISIT (OUTPATIENT)
Dept: FAMILY MEDICINE | Facility: CLINIC | Age: 38
End: 2023-03-27
Payer: COMMERCIAL

## 2023-03-27 VITALS
HEIGHT: 67 IN | DIASTOLIC BLOOD PRESSURE: 70 MMHG | SYSTOLIC BLOOD PRESSURE: 128 MMHG | BODY MASS INDEX: 35.93 KG/M2 | RESPIRATION RATE: 12 BRPM | TEMPERATURE: 98.5 F | OXYGEN SATURATION: 99 % | HEART RATE: 94 BPM | WEIGHT: 228.9 LBS

## 2023-03-27 DIAGNOSIS — G43.109 MIGRAINE WITH AURA AND WITHOUT STATUS MIGRAINOSUS, NOT INTRACTABLE: ICD-10-CM

## 2023-03-27 DIAGNOSIS — Z12.4 CERVICAL CANCER SCREENING: Primary | ICD-10-CM

## 2023-03-27 DIAGNOSIS — R14.0 ABDOMINAL BLOATING: ICD-10-CM

## 2023-03-27 DIAGNOSIS — B00.1 COLD SORE: ICD-10-CM

## 2023-03-27 DIAGNOSIS — N94.6 DYSMENORRHEA: ICD-10-CM

## 2023-03-27 PROCEDURE — 99214 OFFICE O/P EST MOD 30 MIN: CPT | Mod: 25

## 2023-03-27 PROCEDURE — 87624 HPV HI-RISK TYP POOLED RSLT: CPT

## 2023-03-27 PROCEDURE — 99395 PREV VISIT EST AGE 18-39: CPT | Mod: 25

## 2023-03-27 PROCEDURE — G0145 SCR C/V CYTO,THINLAYER,RESCR: HCPCS

## 2023-03-27 RX ORDER — VALACYCLOVIR HYDROCHLORIDE 1 G/1
2000 TABLET, FILM COATED ORAL 2 TIMES DAILY
Qty: 4 TABLET | Refills: 1 | Status: SHIPPED | OUTPATIENT
Start: 2023-03-27 | End: 2024-02-19

## 2023-03-27 RX ORDER — SIMETHICONE 125 MG
125 TABLET,CHEWABLE ORAL 2 TIMES DAILY
Qty: 60 TABLET | Refills: 0 | Status: SHIPPED | OUTPATIENT
Start: 2023-03-27 | End: 2023-04-26

## 2023-03-27 RX ORDER — NORGESTIMATE AND ETHINYL ESTRADIOL 7DAYSX3 LO
1 KIT ORAL DAILY
Qty: 30 TABLET | Refills: 1 | Status: SHIPPED | OUTPATIENT
Start: 2023-03-27 | End: 2024-02-19

## 2023-03-27 RX ORDER — ACETAMINOPHEN AND CODEINE PHOSPHATE 120; 12 MG/5ML; MG/5ML
0.35 SOLUTION ORAL DAILY
Qty: 84 TABLET | Refills: 3 | Status: CANCELLED | OUTPATIENT
Start: 2023-03-27

## 2023-03-27 RX ORDER — SUMATRIPTAN 100 MG/1
100 TABLET, FILM COATED ORAL
Qty: 18 TABLET | Refills: 3 | Status: SHIPPED | OUTPATIENT
Start: 2023-03-27 | End: 2024-02-19

## 2023-03-27 ASSESSMENT — ENCOUNTER SYMPTOMS
FEVER: 0
BREAST MASS: 0
NAUSEA: 0
SORE THROAT: 0
HEMATOCHEZIA: 0
WEAKNESS: 0
PARESTHESIAS: 0
DIZZINESS: 0
HEARTBURN: 0
SHORTNESS OF BREATH: 0
COUGH: 0
ENDOCRINE NEGATIVE: 1
CHILLS: 0
ABDOMINAL PAIN: 0
EYE PAIN: 0
NERVOUS/ANXIOUS: 0
ARTHRALGIAS: 0
DIARRHEA: 0
FREQUENCY: 0
HEMATOLOGIC/LYMPHATIC NEGATIVE: 1
CONSTIPATION: 0
HEMATURIA: 0
DYSURIA: 0
ALLERGIC/IMMUNOLOGIC NEGATIVE: 1
HEADACHES: 0
MYALGIAS: 0
JOINT SWELLING: 0
PALPITATIONS: 0

## 2023-03-27 ASSESSMENT — ASTHMA QUESTIONNAIRES
QUESTION_2 LAST FOUR WEEKS HOW OFTEN HAVE YOU HAD SHORTNESS OF BREATH: NOT AT ALL
QUESTION_5 LAST FOUR WEEKS HOW WOULD YOU RATE YOUR ASTHMA CONTROL: COMPLETELY CONTROLLED
QUESTION_1 LAST FOUR WEEKS HOW MUCH OF THE TIME DID YOUR ASTHMA KEEP YOU FROM GETTING AS MUCH DONE AT WORK, SCHOOL OR AT HOME: NONE OF THE TIME
QUESTION_4 LAST FOUR WEEKS HOW OFTEN HAVE YOU USED YOUR RESCUE INHALER OR NEBULIZER MEDICATION (SUCH AS ALBUTEROL): NOT AT ALL
ACT_TOTALSCORE: 25
QUESTION_3 LAST FOUR WEEKS HOW OFTEN DID YOUR ASTHMA SYMPTOMS (WHEEZING, COUGHING, SHORTNESS OF BREATH, CHEST TIGHTNESS OR PAIN) WAKE YOU UP AT NIGHT OR EARLIER THAN USUAL IN THE MORNING: NOT AT ALL
ACT_TOTALSCORE: 25

## 2023-03-27 NOTE — PROGRESS NOTES
SUBJECTIVE:   CC: Elizabeth is an 38 year old who presents for preventive health visit.   Additional Questions 3/27/2023   Roomed by Irlanda MOCTEZUMA CMA   Accompanied by None     Patient has been advised of split billing requirements and indicates understanding: Yes     Adrienne is a 38-year-old female ambulatory to clinic accompanied by self.  She is here today for an annual physical and Pap screening.  Has history of HSIL with positive high risk HPV with subsequent colposcopy in 2019. Normal annual paps since that time.  She has been having more frequent menses, reports approximately every 14 days.  She is taking progesterone only oral contraceptive pills for regulation of dysmenorrhea.  She would like to switch birth control to something that may help to regulate her cycle and dysmenorrhea better today.  She is sexually active with .  Does not desire pregnancy at this time.    On physical exam she reports frequent diarrhea after eating accompanied by bloating.  She had cholecystectomy in 2013 laparoscopically.    Migraines monthly 3 days duration prior to menstruation. No auras. Well controlled with Imitrex.     She is a 1 pack/day smoker, not interested in quitting at this time.  Denies regular exercise, reports sufficient calcium intake daily.  No concerns with mood today.    She does have a cold sore today and requests valacyclovir be reordered.  Discussion of Abreva for topical treatment.    Healthy Habits:     Getting at least 3 servings of Calcium per day:  Yes    Bi-annual eye exam:  Yes    Dental care twice a year:  Yes    Sleep apnea or symptoms of sleep apnea:  Excessive snoring    Diet:  Regular (no restrictions)    Frequency of exercise:  None    Taking medications regularly:  Yes    Barriers to taking medications:  None    Medication side effects:  None    PHQ-2 Total Score: 1    Additional concerns today:  No    Today's PHQ-2 Score:   PHQ-2 ( 1999 Pfizer) 3/27/2023   Q1: Little interest or pleasure  in doing things 0   Q2: Feeling down, depressed or hopeless 1   PHQ-2 Score 1   Q1: Little interest or pleasure in doing things Not at all   Q2: Feeling down, depressed or hopeless Several days   PHQ-2 Score 1         Social History     Tobacco Use     Smoking status: Every Day     Packs/day: 1.00     Years: 12.00     Pack years: 12.00     Types: Cigarettes     Smokeless tobacco: Never     Tobacco comments:     5/1/17 not ready to quit, delcined info.    Substance Use Topics     Alcohol use: Yes     Comment: Alcoholic Drinks/day: Social         Alcohol Use 3/27/2023   Prescreen: >3 drinks/day or >7 drinks/week? Not Applicable     Reviewed orders with patient.  Reviewed health maintenance and updated orders accordingly - Yes      Breast Cancer Screening:    Breast CA Risk Assessment (FHS-7) 3/4/2022   Do you have a family history of breast, colon, or ovarian cancer? No / Unknown           Pertinent mammograms are reviewed under the imaging tab.    History of abnormal Pap smear: YES - IVA 2/3 on biopsy - PAP/HPV co-testing at 12, 24 months.  If two negative results repeat co-testing in 3 years, if negative then routine screening.  PAP / HPV Latest Ref Rng & Units 3/4/2022 2/2/2021 2/27/2019   PAP   Negative for Intraepithelial Lesion or Malignancy (NILM) Negative for squamous intraepithelial lesion or malignancy  Electronically signed by Jocelin Last CT (ASCP) on 2/12/2021 at 10:36 AM   HSIL encompassing mod/severe dysplasia, CIS, CIN2, CIN3  Electronically signed by Keanu Abreu MD on 3/8/2019 at  8:37 AM  (A)   HPV16 Negative Negative Negative Positive(A)   HPV18 Negative Negative Negative Negative   HRHPV Negative Negative Negative Negative     Reviewed and updated as needed this visit by clinical staff   Tobacco  Allergies  Meds            Reviewed and updated as needed this visit by Provider                     Review of Systems   Constitutional: Negative for chills and fever.   HENT: Negative  for congestion, ear pain, hearing loss and sore throat.    Eyes: Negative for pain and visual disturbance.   Respiratory: Negative for cough and shortness of breath.    Cardiovascular: Negative for chest pain, palpitations and peripheral edema.   Gastrointestinal: Negative for abdominal pain, constipation, diarrhea, heartburn, hematochezia and nausea.   Endocrine: Negative.    Breasts:  Negative for tenderness, breast mass and discharge.   Genitourinary: Negative for dysuria, frequency, genital sores, hematuria, pelvic pain, urgency, vaginal bleeding and vaginal discharge.   Musculoskeletal: Negative for arthralgias, joint swelling and myalgias.   Skin: Negative for rash.   Allergic/Immunologic: Negative.    Neurological: Negative for dizziness, weakness, headaches and paresthesias.   Hematological: Negative.    Psychiatric/Behavioral: Negative for mood changes. The patient is not nervous/anxious.           OBJECTIVE:   LMP 03/13/2023 (Approximate)   Physical Exam  GENERAL: healthy, alert and no distress  EYES: Eyes grossly normal to inspection, PERRL and conjunctivae and sclerae normal  HENT: ear canals and TM's normal, nose and mouth without ulcers or lesions  NECK: no adenopathy, no asymmetry, masses, or scars and thyroid normal to palpation  RESP: lungs clear to auscultation - no rales, rhonchi or wheezes  BREAST: normal without masses, tenderness or nipple discharge and no palpable axillary masses or adenopathy  CV: regular rate and rhythm, normal S1 S2, no S3 or S4, no murmur, click or rub, no peripheral edema and peripheral pulses strong  ABDOMEN: soft, nontender, no hepatosplenomegaly, no masses and bowel sounds normal   (female): normal female external genitalia, normal urethral meatus, vaginal mucosa pink, moist, well rugated, and normal cervix/adnexa/uterus without masses or discharge  RECTAL: normal sphincter tone, no rectal masses  RECTAL: non-inflamed hemorrhoids present on visual exam  MS: no gross  musculoskeletal defects noted, no edema  SKIN: no suspicious lesions or rashes  NEURO: Normal strength and tone, mentation intact and speech normal  PSYCH: mentation appears normal, affect normal/bright  LYMPH: no cervical, supraclavicular, axillary, or inguinal adenopathy        ASSESSMENT/PLAN:   (Z12.4) Cervical cancer screening  (primary encounter diagnosis)  Comment: Pap screen performed today and patient will be notified of results via [employer] when available, and plan of care adjusted as needed.  Plan: Pap Screen with HPV - recommended age 30 - 65         years        (N94.6) Dysmenorrhea  Comment: Progesterone only pill discontinue today and will trial combined oral contraception for dysmenorrhea and regulation of menstrual cycle.  Patient having periods frequently every 2 weeks. patient denies any history of blood clots.  No known family history of breast or ovarian cancer.  Patient instructed to use backup method for 1 week.  Plan: norgestim-eth estrad triphasic (ORTHO         TRI-CYCLEN LO) 0.18/0.215/0.25 MG-25 MCG tablet  UPDATE: Consult with Dr. [facility] for risk of blood clots with PEDRO and getting in women over age 35.  Generally low risk with low dose estrogen as prescribed.  Patient instructed on warning signs of blood clot and PE.     (R14.0) Abdominal bloating  Comment: Concerns with abdominal bloating and diarrhea after eating.  Cholecystectomy in 2013.  Patient reports symptoms have been intermittent since then.  Plan: simethicone (MYLICON) 125 MG chewable tablet    (G43.109) Migraine with aura and without status migrainosus, not intractable  Comment: Well-controlled with Imitrex, condition stable and medication reordered today.  Plan: SUMAtriptan (IMITREX) 100 MG tablet        (B00.1) Cold sore  Comment: Valacyclovir reordered today to be taken at first sign of cold sore.  Plan: valACYclovir (VALTREX) 1000 mg tablet        Also discussed Abreva topical for treatment of cold sore available  over-the-counter.            COUNSELING:  Reviewed preventive health counseling, as reflected in patient instructions  Special attention given to:        Immunizations    Vaccinated for: Covid-19    No record of hepatitis B immunization on record.  Patient unsure if this would have been done somewhere else.  Declines vaccination for hepatitis B today.  Patient instructed to bring any records of past vaccination to clinic should they become available.       Contraception     She reports that she has been smoking cigarettes. She has a 12.00 pack-year smoking history. She has never used smokeless tobacco.  Nicotine/Tobacco Cessation Plan:   Information offered: Patient not interested at this time          ANNI Hernandez CNP [facility]

## 2023-03-27 NOTE — PATIENT INSTRUCTIONS
For hemorrhoids may use Preparation H external as needed for flare ups and Tucks pads regularly to prevent flare ups or for minor irritation.

## 2023-04-05 PROBLEM — D06.9 CIN III (CERVICAL INTRAEPITHELIAL NEOPLASIA III): Status: ACTIVE | Noted: 2019-03-08

## 2024-02-12 SDOH — HEALTH STABILITY: PHYSICAL HEALTH: ON AVERAGE, HOW MANY DAYS PER WEEK DO YOU ENGAGE IN MODERATE TO STRENUOUS EXERCISE (LIKE A BRISK WALK)?: 1 DAY

## 2024-02-12 SDOH — HEALTH STABILITY: PHYSICAL HEALTH: ON AVERAGE, HOW MANY MINUTES DO YOU ENGAGE IN EXERCISE AT THIS LEVEL?: 20 MIN

## 2024-02-12 ASSESSMENT — SOCIAL DETERMINANTS OF HEALTH (SDOH): HOW OFTEN DO YOU GET TOGETHER WITH FRIENDS OR RELATIVES?: ONCE A WEEK

## 2024-02-12 ASSESSMENT — ASTHMA QUESTIONNAIRES
QUESTION_3 LAST FOUR WEEKS HOW OFTEN DID YOUR ASTHMA SYMPTOMS (WHEEZING, COUGHING, SHORTNESS OF BREATH, CHEST TIGHTNESS OR PAIN) WAKE YOU UP AT NIGHT OR EARLIER THAN USUAL IN THE MORNING: NOT AT ALL
QUESTION_2 LAST FOUR WEEKS HOW OFTEN HAVE YOU HAD SHORTNESS OF BREATH: NOT AT ALL
ACT_TOTALSCORE: 25
QUESTION_5 LAST FOUR WEEKS HOW WOULD YOU RATE YOUR ASTHMA CONTROL: COMPLETELY CONTROLLED
ACT_TOTALSCORE: 25
QUESTION_4 LAST FOUR WEEKS HOW OFTEN HAVE YOU USED YOUR RESCUE INHALER OR NEBULIZER MEDICATION (SUCH AS ALBUTEROL): NOT AT ALL
QUESTION_1 LAST FOUR WEEKS HOW MUCH OF THE TIME DID YOUR ASTHMA KEEP YOU FROM GETTING AS MUCH DONE AT WORK, SCHOOL OR AT HOME: NONE OF THE TIME

## 2024-02-19 ENCOUNTER — OFFICE VISIT (OUTPATIENT)
Dept: FAMILY MEDICINE | Facility: CLINIC | Age: 39
End: 2024-02-19
Payer: COMMERCIAL

## 2024-02-19 VITALS
OXYGEN SATURATION: 98 % | DIASTOLIC BLOOD PRESSURE: 90 MMHG | TEMPERATURE: 98.8 F | RESPIRATION RATE: 28 BRPM | HEIGHT: 67 IN | BODY MASS INDEX: 36.51 KG/M2 | HEART RATE: 104 BPM | SYSTOLIC BLOOD PRESSURE: 128 MMHG | WEIGHT: 232.6 LBS

## 2024-02-19 DIAGNOSIS — B00.1 COLD SORE: ICD-10-CM

## 2024-02-19 DIAGNOSIS — J45.20 MILD INTERMITTENT ASTHMA WITHOUT COMPLICATION: ICD-10-CM

## 2024-02-19 DIAGNOSIS — G43.109 MIGRAINE WITH AURA AND WITHOUT STATUS MIGRAINOSUS, NOT INTRACTABLE: ICD-10-CM

## 2024-02-19 DIAGNOSIS — R14.0 ABDOMINAL BLOATING: ICD-10-CM

## 2024-02-19 DIAGNOSIS — E66.09 CLASS 2 OBESITY DUE TO EXCESS CALORIES WITHOUT SERIOUS COMORBIDITY WITH BODY MASS INDEX (BMI) OF 36.0 TO 36.9 IN ADULT: ICD-10-CM

## 2024-02-19 DIAGNOSIS — Z00.00 HEALTHCARE MAINTENANCE: Primary | ICD-10-CM

## 2024-02-19 DIAGNOSIS — L98.9 SKIN LESION: ICD-10-CM

## 2024-02-19 DIAGNOSIS — E66.812 CLASS 2 OBESITY DUE TO EXCESS CALORIES WITHOUT SERIOUS COMORBIDITY WITH BODY MASS INDEX (BMI) OF 36.0 TO 36.9 IN ADULT: ICD-10-CM

## 2024-02-19 PROCEDURE — 11300 SHAVE SKIN LESION 0.5 CM/<: CPT | Performed by: FAMILY MEDICINE

## 2024-02-19 PROCEDURE — 36415 COLL VENOUS BLD VENIPUNCTURE: CPT | Performed by: FAMILY MEDICINE

## 2024-02-19 PROCEDURE — 99213 OFFICE O/P EST LOW 20 MIN: CPT | Mod: 25 | Performed by: FAMILY MEDICINE

## 2024-02-19 PROCEDURE — 86364 TISS TRNSGLTMNASE EA IG CLAS: CPT | Performed by: FAMILY MEDICINE

## 2024-02-19 PROCEDURE — 99395 PREV VISIT EST AGE 18-39: CPT | Mod: 25 | Performed by: FAMILY MEDICINE

## 2024-02-19 PROCEDURE — 88305 TISSUE EXAM BY PATHOLOGIST: CPT | Performed by: PATHOLOGY

## 2024-02-19 RX ORDER — ALBUTEROL SULFATE 90 UG/1
2 AEROSOL, METERED RESPIRATORY (INHALATION) EVERY 6 HOURS PRN
Qty: 19 G | Refills: 4 | Status: SHIPPED | OUTPATIENT
Start: 2024-02-19

## 2024-02-19 RX ORDER — SUMATRIPTAN 100 MG/1
100 TABLET, FILM COATED ORAL
Qty: 18 TABLET | Refills: 3 | Status: SHIPPED | OUTPATIENT
Start: 2024-02-19

## 2024-02-19 RX ORDER — VALACYCLOVIR HYDROCHLORIDE 1 G/1
2000 TABLET, FILM COATED ORAL 2 TIMES DAILY
Qty: 4 TABLET | Refills: 1 | Status: SHIPPED | OUTPATIENT
Start: 2024-02-19

## 2024-02-19 NOTE — PROGRESS NOTES
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  - valACYclovir (VALTREX) 1000 mg tablet; Take 2 tablets (2,000 mg) by mouth 2 times daily    Migraine with aura and without status migrainosus, not intractable  Menstrual migraine treated successfully with sumatriptan  - SUMAtriptan (IMITREX) 100 MG tablet; Take 1 tablet (100 mg) by mouth at onset of headache for migraine    Mild intermittent asthma without complication  Controlled, continue with current inhaler  - albuterol (PROAIR HFA/PROVENTIL HFA/VENTOLIN HFA) 108 (90 Base) MCG/ACT inhaler; Inhale 2 puffs into the lungs every 6 hours as needed for shortness of breath or wheezing    Abdominal bloating  Differential diagnosis includes gluten intolerance, peptic acid disease, bile salt diarrhea.  Check celiac panel and if negative consider cholestyramine daily.  - Tissue transglutaminase stephani IgA and IgG; Future  - Tissue transglutaminase stephani IgA and IgG    Class 2 obesity due to excess calories without serious comorbidity with body mass index (BMI) of 36.0 to 36.9 in adult  Referral to nutrition for discussion of weight loss and possible medical management of weight loss  - Nutrition Referral; Future    Skin lesion  Informed consent obtained, discussed pain, bleeding, scarring. Skin was prepped with alcohol and anesthetized with 1% lidocaine with epinephrine. The lesion was shaved with curved razor blade and the base treated with electrodesiccation. Patient tolerated the procedure well.  Specimen sent to pathology.     - Surgical Pathology Exam            BMI  Estimated body mass index is 36.98 kg/m  as calculated from the following:    Height as of this encounter: 1.689 m (5' 6.5\").    " Weight as of this encounter: 105.5 kg (232 lb 9.6 oz).       Counseling  Appropriate preventive services were discussed with this patient, including applicable screening as appropriate for fall prevention, nutrition, physical activity, Tobacco-use cessation, weight loss and cognition.  Checklist reviewing preventive services available has been given to the patient.  Reviewed patient's diet, addressing concerns and/or questions.   She is at risk for lack of exercise and has been provided with information to increase physical activity for the benefit of her well-being.   The patient was instructed to see the dentist every 6 months.   She is at risk for psychosocial distress and has been provided with information to reduce risk.     Patient has been advised of split billing requirements and indicates understanding: Yes    MEDICATIONS:  Continue current medications without change  Work on weight loss  Regular exercise    Darren Johnson is a 39 year old, presenting for the following:  Physical (Annual px, would like pap smear done today.  Also discuss skin tags and wt loss medication--Wegovy)        2/19/2024    10:41 AM   Additional Questions   Roomed by Jessica PATHAK CMA   Accompanied by Self        Health Care Directive  Patient does not have a Health Care Directive or Living Will: Discussed advance care planning with patient; information given to patient to review.    HPI  Patient is here for health maintenance.  She is having trouble losing weight.  She has been on numerous diets in the past without much improvement.  She had a cholecystectomy done about 10 years ago.  And since that time she has had discomfort and bowel movements after eating.  She notes this improved a bit when she was on a ketogenic diet.  Symptoms mainly occur after eating.  This is disruptive to her lifestyle and work.  Illogically and virally mediated at his mom are well-controlled with intermittent use of albuterol MDI.  She has had an abnormal  Pap smear within the last 5 years.  IVA 2/3 and 3 were found which was treated with a LEEP procedure.  Since that time she has had 3 normal Pap smears with negative HPV.  She has an irritated skin tag on the right shoulder she would like to have removed.            2/12/2024   General Health   How would you rate your overall physical health? Good   Feel stress (tense, anxious, or unable to sleep) Only a little   (!) STRESS CONCERN      2/12/2024   Nutrition   Three or more servings of calcium each day? Yes   Diet: Regular (no restrictions)   How many servings of fruit and vegetables per day? (!) 0-1   How many sweetened beverages each day? (!) 4+         2/12/2024   Exercise   Days per week of moderate/strenous exercise 1 day   Average minutes spent exercising at this level 20 min   (!) EXERCISE CONCERN      2/12/2024   Social Factors   Frequency of gathering with friends or relatives Once a week   Worry food won't last until get money to buy more No   Food not last or not have enough money for food? No   Do you have housing?  Yes   Are you worried about losing your housing? No   Lack of transportation? No   Unable to get utilities (heat,electricity)? No         2/12/2024   Dental   Dentist two times every year? (!) NO         2/12/2024   TB Screening   Were you born outside of [place]?  No         Today's PHQ-2 Score:       2/19/2024    10:24 AM   PHQ-2 ( 1999 Pfizer)   Q1: Little interest or pleasure in doing things 1   Q2: Feeling down, depressed or hopeless 1   PHQ-2 Score 2   Q1: Little interest or pleasure in doing things Several days   Q2: Feeling down, depressed or hopeless Several days   PHQ-2 Score 2           2/12/2024   Substance Use   Alcohol more than 3/day or more than 7/wk No   Do you use any other substances recreationally? No     Social History     Tobacco Use    Smoking status: Every Day     Packs/day: 1.00     Years: 12.00     Additional pack years: 0.00     Total pack years: 12.00     Types:  Cigarettes     Passive exposure: Current    Smokeless tobacco: Never    Tobacco comments:     3/27/23 not ready to quit, delcined info.    Vaping Use    Vaping Use: Every day    Substances: Nicotine, Flavoring    Devices: Disposable    Passive vaping exposure: Yes   Substance Use Topics    Alcohol use: Yes     Comment: Alcoholic Drinks/day: Social    Drug use: No             2/12/2024   Breast Cancer Screening   Family history of breast, colon, or ovarian cancer? No / Unknown              2/12/2024   STI Screening   New sexual partner(s) since last STI/HIV test? No     History of abnormal Pap smear: YES - IVA 2/3 on biopsy - PAP/HPV co-testing at 12, 24 months.  If two negative results repeat co-testing in 3 years, if negative then routine screening.        Latest Ref Rng & Units 3/27/2023    10:42 AM 3/4/2022     7:49 AM 2/2/2021     4:25 PM   PAP / HPV   PAP  Negative for Intraepithelial Lesion or Malignancy (NILM)  Negative for Intraepithelial Lesion or Malignancy (NILM)  Negative for squamous intraepithelial lesion or malignancy  Electronically signed by Jocelin Last CT (ASCP) on 2/12/2021 at 10:36 AM      HPV 16 DNA Negative Negative  Negative  Negative    HPV 18 DNA Negative Negative  Negative  Negative    Other HR HPV Negative Negative  Negative  Negative            2/12/2024   Contraception/Family Planning   Questions about contraception or family planning No        Reviewed and updated as needed this visit by Provider                    Past Medical History:   Diagnosis Date    Acute apical periodontitis of pulpal origin 11/23/2012    Anxiety 5/1/2017    Asthma     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 Referral placed for OBGYN for further management.      Past Surgical History:   Procedure Laterality Date    CONIZATION CERVIX,KNIFE/LASER N/A 4/16/2019    Procedure: COLD KNIFE CONIZATION OFCERVIX;  Surgeon: Alicja Bonilla MD;  [facility]: [facility];   "Service: Gynecology    LAPAROSCOPIC CHOLECYSTECTOMY  2013    TONSILLECTOMY & ADENOIDECTOMY       OB History   No obstetric history on file.     BP Readings from Last 3 Encounters:   02/19/24 (!) 128/90   03/27/23 128/70   03/04/22 122/73    Wt Readings from Last 3 Encounters:   02/19/24 105.5 kg (232 lb 9.6 oz)   03/27/23 103.8 kg (228 lb 14.4 oz)   03/04/22 103.9 kg (229 lb 2 oz)                      Review of Systems  Constitutional, neuro, ENT, endocrine, pulmonary, cardiac, gastrointestinal, genitourinary, musculoskeletal, integument and psychiatric systems are negative, except as otherwise noted.     Objective    Exam  BP (!) 128/90 (BP Location: Right arm, Patient Position: Sitting, Cuff Size: Adult Large)   Pulse 104   Temp 98.8  F (37.1  C) (Tympanic)   Resp 28   Ht 1.689 m (5' 6.5\")   Wt 105.5 kg (232 lb 9.6 oz)   LMP 02/05/2024 (Approximate)   SpO2 98%   BMI 36.98 kg/m     Estimated body mass index is 36.98 kg/m  as calculated from the following:    Height as of this encounter: 1.689 m (5' 6.5\").    Weight as of this encounter: 105.5 kg (232 lb 9.6 oz).    Physical Exam  GENERAL: alert and no distress  EYES: Eyes grossly normal to inspection, PERRL and conjunctivae and sclerae normal  HENT: ear canals and TM's normal, nose and mouth without ulcers or lesions  NECK: no adenopathy, no asymmetry, masses, or scars  RESP: lungs clear to auscultation - no rales, rhonchi or wheezes  CV: regular rate and rhythm, normal S1 S2, no S3 or S4, no murmur, click or rub, no peripheral edema  ABDOMEN: soft, nontender, no hepatosplenomegaly, no masses and bowel sounds normal  MS: no gross musculoskeletal defects noted, no edema  SKIN: no suspicious lesions or rashes, 3 mm irritated papule anterior right shoulder  NEURO: Normal strength and tone, mentation intact and speech normal  PSYCH: mentation appears normal, affect normal/bright      Signed Electronically by: Sushant Vazquez MD    "

## 2024-02-21 DIAGNOSIS — R14.0 ABDOMINAL BLOATING: ICD-10-CM

## 2024-02-21 DIAGNOSIS — E04.9 GOITER: Primary | ICD-10-CM

## 2024-02-21 LAB
PATH REPORT.COMMENTS IMP SPEC: NORMAL
PATH REPORT.COMMENTS IMP SPEC: NORMAL
PATH REPORT.FINAL DX SPEC: NORMAL
PATH REPORT.GROSS SPEC: NORMAL
PATH REPORT.MICROSCOPIC SPEC OTHER STN: NORMAL
PATH REPORT.RELEVANT HX SPEC: NORMAL
PHOTO IMAGE: NORMAL
TTG IGA SER-ACNC: 0.9 U/ML
TTG IGG SER-ACNC: <0.6 U/ML

## 2024-02-21 RX ORDER — CHOLESTYRAMINE 4 G/9G
4 POWDER, FOR SUSPENSION ORAL 2 TIMES DAILY WITH MEALS
Qty: 60 G | Refills: 1 | Status: SHIPPED | OUTPATIENT
Start: 2024-02-21

## 2024-02-22 ENCOUNTER — OFFICE VISIT (OUTPATIENT)
Dept: EDUCATION SERVICES | Facility: CLINIC | Age: 39
End: 2024-02-22
Attending: FAMILY MEDICINE
Payer: COMMERCIAL

## 2024-02-22 DIAGNOSIS — E66.09 CLASS 2 OBESITY DUE TO EXCESS CALORIES WITHOUT SERIOUS COMORBIDITY WITH BODY MASS INDEX (BMI) OF 36.0 TO 36.9 IN ADULT: Primary | ICD-10-CM

## 2024-02-22 DIAGNOSIS — E66.812 CLASS 2 OBESITY DUE TO EXCESS CALORIES WITHOUT SERIOUS COMORBIDITY WITH BODY MASS INDEX (BMI) OF 36.0 TO 36.9 IN ADULT: Primary | ICD-10-CM

## 2024-02-22 PROCEDURE — 97802 MEDICAL NUTRITION INDIV IN: CPT | Performed by: DIETITIAN, REGISTERED

## 2024-02-22 NOTE — LETTER
2/22/2024         RE: Elizabeth Barkley  [address] [facility] [address]        Dear Colleague,    Thank you for referring your patient, Elizabeth Barkley, to the [facility]. Please see a copy of my visit note below.    Medical Nutrition Therapy  Visit Type:Initial assessment and intervention    Elizabeth Barkley presents today for MNT and education related to weight management for class 2 obesity   She is accompanied by self.     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  Pt is interested in learning dietary recommendations to promote weight loss and is also interested in weight loss medication.  Pt reports frequent BM's and was seen by GI - no dx, neg celiac.  Pt also states frequent urination 12+x/ day without pain/ burning etc.      NUTRITION HISTORY:  Due to bowels pt typically skips breakfast and lunch and will have an evening meal and then will eat chips afterwards.  Dislikes most cooked vegetables will eat some raw and will eat fruit.    Drinking a coffee dring 4-6x/ day with cream, syrup and 2 cans of regular soda   Previous diet education:  No     Food allergies/intolerances: ***    Diet is high in: calories  Diet is low in: fiber, fruits, and vegetables    EXERCISE: no regular exercise program    SOCIO/ECONOMIC:   Lives with: self    MEDICATIONS:  Current Outpatient Medications   Medication     albuterol (PROAIR HFA/PROVENTIL HFA/VENTOLIN HFA) 108 (90 Base) MCG/ACT inhaler     cholestyramine (QUESTRAN) 4 GM/DOSE powder     EPINEPHrine (ANY BX GENERIC EQUIV) 0.3 MG/0.3ML injection 2-pack     loratadine (CLARITIN) 10 MG tablet     naproxen (NAPROSYN) 500 MG tablet     SUMAtriptan (IMITREX) 100 MG tablet     valACYclovir (VALTREX) 1000 mg tablet     No current facility-administered medications for this visit.       LABS:  Lab Results   Component Value Date     02/02/2021      Lab Results  "  Component Value Date    POTASSIUM 4.0 02/02/2021     Lab Results   Component Value Date    CHLORIDE 105 02/02/2021     Lab Results   Component Value Date    SERENE 8.8 02/02/2021     Lab Results   Component Value Date    CO2 21 02/02/2021     Lab Results   Component Value Date    BUN 10 02/02/2021     Lab Results   Component Value Date    CR 0.73 02/02/2021     Lab Results   Component Value Date    GLC 85 02/02/2021     Lab Results   Component Value Date     02/02/2021     Direct Measure HDL   Date Value Ref Range Status   02/02/2021 52 >=50 mg/dL Final   ]  GFR Estimate   Date Value Ref Range Status   02/02/2021 >60 >60 mL/min/1.73m2 Final     Lab Results   Component Value Date    CR 0.73 02/02/2021     No results found for: \"MICROALBUMIN\"    ANTHROPOMETRICS:  Vitals: Ht 1.689 m (5' 6.5\")   Wt 103.7 kg (228 lb 11.2 oz)   LMP 02/05/2024 (Approximate)   BMI 36.36 kg/m    Body mass index is 36.36 kg/m .      Wt Readings from Last 5 Encounters:   02/22/24 103.7 kg (228 lb 11.2 oz)   02/19/24 105.5 kg (232 lb 9.6 oz)   03/27/23 103.8 kg (228 lb 14.4 oz)   03/04/22 103.9 kg (229 lb 2 oz)   02/02/21 97.1 kg (214 lb)       ESTIMATED KCAL REQUIREMENTS:  1200 kcal per day      NUTRITION DIAGNOSIS: Overweight/Obesity related to increased calorie intake as evidenced by Body mass index is 36.36 kg/m .       NUTRITION INTERVENTION:  Nutrition Prescription: Energy Intake: 1200 kcal/day  Education given to support: general nutrition guidelines, weight reduction, exercise and portion control  Education Materials Provided: My Plate Planner/Choose My Plate and Fiber Facts, brief FODMAP plan for GI     Discussed what dietary changes has worked well for her in the past and how to incorporate them again.  Discussion on healthy behavior changes that can promote calorie reduction in diet.    We discussed why it is important to incorporate healthy lifestyle interventions to control overall health to prevent complications of being " obese including the potential of developing diabetes and preventing heart disease.      Nutritional Psychiatry Your Brain on Food - discussion on how what you eat affects microbiome/ hormones and how you feel physically and emotionally.    Hunger/ Fullness cues - help identify how pt is feeling before, during, and after eating   Smart Snacking and 20 Ways to eat more fruit and vegetables.    Mindful eating - listening to body vs eating out of stress, boredom, emotion, eating to fuel body for strength and energy   Tracking food - balance of meals and snacks   Importance of daily physical activity as able to promote endorphin release       reduce stress, anxiety, and depression, improve sleep, increase energy level, improve muscle tone and strength ...   Recommend: Discussed options for medications and reduced calorie dietary guidelines.    Zepbound: proper use, mechanism of action, indications, dosing, injection schedule, safety and side effects.  Pt is shown a demonstration of the use of the pen and was able to return demonstration without difficulty.   Dose Escalation Schedule  Start Zepbound at   2.5 mg weekly x 4 weeks increasing as tolerated/ needed for ongoing weight loss and appetite suppression  5.0 mg weekly    7.5 mg weekly   10 mg weekly   12.5 mg weekly    15 mg weekly goal dose    2nd choice would be Wegovy.  Pt is also instructed on proper use, mechanism of action, indications, dosing, injection schedule, safety and side effects.  Pt is shown a demonstration of the use of the pen and was able to return demonstration without difficulty.     Coupons printed.    PATIENT'S BEHAVIOR CHANGE GOALS:   See Patient Instructions for patient stated behavior change goals. AVS was printed and given to patient at today's appointment.    MONITOR / EVALUATE:  RD will monitor/evaluate:  Pertinent Labs  Readiness to change nutrition-related behaviors  Weight change    FOLLOW-UP:  3 months    Time spent in minutes:  60  Encounter: Individual

## 2024-02-22 NOTE — PATIENT INSTRUCTIONS
Fiber supplement taking up to 15 grams/ day start slow     Potentially FODMAP for GI issues     Hint hart   Eliminate regular soda         Zepbound: proper use, mechanism of action, indications, dosing, injection schedule, safety and side effects.  Pt is shown a demonstration of the use of the pen and was able to return demonstration without difficulty.   Dose Escalation Schedule  Start Zepbound at   2.5 mg weekly x 4 weeks increasing as tolerated/ needed for ongoing weight loss and appetite suppression  5.0 mg weekly    7.5 mg weekly   10 mg weekly   12.5 mg weekly    15 mg weekly goal dose      2nd choice Wegovy   Dose Escalation Schedule  Weeks    Weekly Dose  1 through 4   0.25 mg  5 through 8   0.5 mg  9 through 12   1 mg  13 through 16   1.7 mg  Week 17 and onward  2.4 mg Maintenance dose    1200 Calories     Snack size chip bags to help with portion control         Goals:  Practice healthy stress management (mindful eating, think are you physically hungry or are you board, stressed, emotional etc.) make of list of things to do besides eat.    Try to get good quality sleep with a goal of 7-8 hours per night.  Stay physically active on a daily basis and throughout the year.  Recommend a fitness tracker; gradually increase the average to a minimum of 6000 steps with the ultimate goal of 10,000 steps per day.      Eat in a healthy way; follow the plate method.  Keep a food record ([employer]; [employer]).  Nutrition reference:  Eat 3 meals a day; snacks are optional.    A meal is 3 or more food groups; make it colorful for better nutrition.

## 2024-02-22 NOTE — LETTER
2/22/2024         RE: Elizabeth Barkley  [address] [facility] [address]        Dear Colleague,    Thank you for referring your patient, Elizabeth Barkley, to the [facility]. Please see a copy of my visit note below.    Medical Nutrition Therapy  Visit Type:Initial assessment and intervention    Elizabeth Barkley presents today for MNT and education related to weight management for class 2 obesity   She is accompanied by self.     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  Pt is interested in learning dietary recommendations to promote weight loss and is also interested in weight loss medication.  Pt reports frequent BM's and was seen by GI - no dx, neg celiac.  Pt also states frequent urination 12+x/ day without pain/ burning etc.      NUTRITION HISTORY:  Due to bowels pt typically skips breakfast and lunch and will have an evening meal and then will eat chips afterwards.  Dislikes most cooked vegetables will eat some raw and will eat fruit.    Drinking a coffee dring 4-6x/ day with cream, syrup and 2 cans of regular soda   Previous diet education:  No     Food allergies/intolerances: NKFA    Diet is high in: calories  Diet is low in: fiber, fruits, and vegetables    EXERCISE: no regular exercise program    SOCIO/ECONOMIC:   Lives with: self    MEDICATIONS:  Current Outpatient Medications   Medication    albuterol (PROAIR HFA/PROVENTIL HFA/VENTOLIN HFA) 108 (90 Base) MCG/ACT inhaler    cholestyramine (QUESTRAN) 4 GM/DOSE powder    EPINEPHrine (ANY BX GENERIC EQUIV) 0.3 MG/0.3ML injection 2-pack    loratadine (CLARITIN) 10 MG tablet    naproxen (NAPROSYN) 500 MG tablet    SUMAtriptan (IMITREX) 100 MG tablet    valACYclovir (VALTREX) 1000 mg tablet     No current facility-administered medications for this visit.       LABS:  Lab Results   Component Value Date     02/02/2021      Lab Results  "  Component Value Date    POTASSIUM 4.0 02/02/2021     Lab Results   Component Value Date    CHLORIDE 105 02/02/2021     Lab Results   Component Value Date    SERENE 8.8 02/02/2021     Lab Results   Component Value Date    CO2 21 02/02/2021     Lab Results   Component Value Date    BUN 10 02/02/2021     Lab Results   Component Value Date    CR 0.73 02/02/2021     Lab Results   Component Value Date    GLC 85 02/02/2021     Lab Results   Component Value Date     02/02/2021     Direct Measure HDL   Date Value Ref Range Status   02/02/2021 52 >=50 mg/dL Final   ]  GFR Estimate   Date Value Ref Range Status   02/02/2021 >60 >60 mL/min/1.73m2 Final     Lab Results   Component Value Date    CR 0.73 02/02/2021     No results found for: \"MICROALBUMIN\"    ANTHROPOMETRICS:  Vitals: Ht 1.689 m (5' 6.5\")   Wt 103.7 kg (228 lb 11.2 oz)   LMP 02/05/2024 (Approximate)   BMI 36.36 kg/m    Body mass index is 36.36 kg/m .      Wt Readings from Last 5 Encounters:   02/22/24 103.7 kg (228 lb 11.2 oz)   02/19/24 105.5 kg (232 lb 9.6 oz)   03/27/23 103.8 kg (228 lb 14.4 oz)   03/04/22 103.9 kg (229 lb 2 oz)   02/02/21 97.1 kg (214 lb)       ESTIMATED KCAL REQUIREMENTS:  1200 kcal per day      NUTRITION DIAGNOSIS: Overweight/Obesity related to increased calorie intake as evidenced by Body mass index is 36.36 kg/m .       NUTRITION INTERVENTION:  Nutrition Prescription: Energy Intake: 1200 kcal/day  Education given to support: general nutrition guidelines, weight reduction, exercise and portion control  Education Materials Provided: My Plate Planner/Choose My Plate and Fiber Facts, brief FODMAP plan for GI     Discussed what dietary changes has worked well for her in the past and how to incorporate them again.  Discussion on healthy behavior changes that can promote calorie reduction in diet.    We discussed why it is important to incorporate healthy lifestyle interventions to control overall health to prevent complications of being " obese including the potential of developing diabetes and preventing heart disease.      Nutritional Psychiatry Your Brain on Food - discussion on how what you eat affects microbiome/ hormones and how you feel physically and emotionally.    Hunger/ Fullness cues - help identify how pt is feeling before, during, and after eating   Smart Snacking and 20 Ways to eat more fruit and vegetables.    Mindful eating - listening to body vs eating out of stress, boredom, emotion, eating to fuel body for strength and energy   Tracking food - balance of meals and snacks   Importance of daily physical activity as able to promote endorphin release       reduce stress, anxiety, and depression, improve sleep, increase energy level, improve muscle tone and strength ...   Recommend: Discussed options for medications and reduced calorie dietary guidelines.    Zepbound: proper use, mechanism of action, indications, dosing, injection schedule, safety and side effects.  Pt is shown a demonstration of the use of the pen and was able to return demonstration without difficulty.   Dose Escalation Schedule  Start Zepbound at   2.5 mg weekly x 4 weeks increasing as tolerated/ needed for ongoing weight loss and appetite suppression  5.0 mg weekly    7.5 mg weekly   10 mg weekly   12.5 mg weekly    15 mg weekly goal dose    2nd choice would be Wegovy.  Pt is also instructed on proper use, mechanism of action, indications, dosing, injection schedule, safety and side effects.  Pt is shown a demonstration of the use of the pen and was able to return demonstration without difficulty.     Coupons printed.    PATIENT'S BEHAVIOR CHANGE GOALS:   See Patient Instructions for patient stated behavior change goals. AVS was printed and given to patient at today's appointment.    MONITOR / EVALUATE:  RD will monitor/evaluate:  Pertinent Labs  Readiness to change nutrition-related behaviors  Weight change    FOLLOW-UP:  3 months    Time spent in minutes:  60  Encounter: Individual

## 2024-02-23 VITALS — HEIGHT: 67 IN | WEIGHT: 228.7 LBS | BODY MASS INDEX: 35.9 KG/M2

## 2024-02-23 RX ORDER — TIRZEPATIDE 2.5 MG/.5ML
2.5 INJECTION, SOLUTION SUBCUTANEOUS
Qty: 2 ML | Refills: 0 | Status: SHIPPED | OUTPATIENT
Start: 2024-02-23 | End: 2024-08-24

## 2024-02-23 RX ORDER — TIRZEPATIDE 10 MG/.5ML
10 INJECTION, SOLUTION SUBCUTANEOUS
Qty: 2 ML | Refills: 0 | Status: SHIPPED | OUTPATIENT
Start: 2024-05-17 | End: 2024-08-24

## 2024-02-23 RX ORDER — TIRZEPATIDE 5 MG/.5ML
5 INJECTION, SOLUTION SUBCUTANEOUS
Qty: 2 ML | Refills: 0 | Status: SHIPPED | OUTPATIENT
Start: 2024-03-22 | End: 2024-08-24

## 2024-02-23 RX ORDER — TIRZEPATIDE 7.5 MG/.5ML
7.5 INJECTION, SOLUTION SUBCUTANEOUS
Qty: 2 ML | Refills: 0 | Status: SHIPPED | OUTPATIENT
Start: 2024-04-19 | End: 2024-08-24

## 2024-02-23 NOTE — PROGRESS NOTES
Medical Nutrition Therapy  Visit Type:Initial assessment and intervention    Elizabeth Barkley presents today for MNT and education related to weight management for class 2 obesity   She is accompanied by self.     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  Pt is interested in learning dietary recommendations to promote weight loss and is also interested in weight loss medication.  Pt reports frequent BM's and was seen by GI - no dx, neg celiac.  Pt also states frequent urination 12+x/ day without pain/ burning etc.      NUTRITION HISTORY:  Due to bowels pt typically skips breakfast and lunch and will have an evening meal and then will eat chips afterwards.  Dislikes most cooked vegetables will eat some raw and will eat fruit.    Drinking a coffee dring 4-6x/ day with cream, syrup and 2 cans of regular soda   Previous diet education:  No     Food allergies/intolerances: NKFA    Diet is high in: calories  Diet is low in: fiber, fruits, and vegetables    EXERCISE: no regular exercise program    SOCIO/ECONOMIC:   Lives with: self    MEDICATIONS:  Current Outpatient Medications   Medication    albuterol (PROAIR HFA/PROVENTIL HFA/VENTOLIN HFA) 108 (90 Base) MCG/ACT inhaler    cholestyramine (QUESTRAN) 4 GM/DOSE powder    EPINEPHrine (ANY BX GENERIC EQUIV) 0.3 MG/0.3ML injection 2-pack    loratadine (CLARITIN) 10 MG tablet    naproxen (NAPROSYN) 500 MG tablet    SUMAtriptan (IMITREX) 100 MG tablet    valACYclovir (VALTREX) 1000 mg tablet     No current facility-administered medications for this visit.       LABS:  Lab Results   Component Value Date     02/02/2021      Lab Results   Component Value Date    POTASSIUM 4.0 02/02/2021     Lab Results   Component Value Date    CHLORIDE 105 02/02/2021     Lab Results   Component Value Date    SERENE 8.8 02/02/2021     Lab Results   Component Value Date    CO2 21 02/02/2021     Lab Results  "  Component Value Date    BUN 10 02/02/2021     Lab Results   Component Value Date    CR 0.73 02/02/2021     Lab Results   Component Value Date    GLC 85 02/02/2021     Lab Results   Component Value Date     02/02/2021     Direct Measure HDL   Date Value Ref Range Status   02/02/2021 52 >=50 mg/dL Final   ]  GFR Estimate   Date Value Ref Range Status   02/02/2021 >60 >60 mL/min/1.73m2 Final     Lab Results   Component Value Date    CR 0.73 02/02/2021     No results found for: \"MICROALBUMIN\"    ANTHROPOMETRICS:  Vitals: Ht 1.689 m (5' 6.5\")   Wt 103.7 kg (228 lb 11.2 oz)   LMP 02/05/2024 (Approximate)   BMI 36.36 kg/m    Body mass index is 36.36 kg/m .      Wt Readings from Last 5 Encounters:   02/22/24 103.7 kg (228 lb 11.2 oz)   02/19/24 105.5 kg (232 lb 9.6 oz)   03/27/23 103.8 kg (228 lb 14.4 oz)   03/04/22 103.9 kg (229 lb 2 oz)   02/02/21 97.1 kg (214 lb)       ESTIMATED KCAL REQUIREMENTS:  1200 kcal per day      NUTRITION DIAGNOSIS: Overweight/Obesity related to increased calorie intake as evidenced by Body mass index is 36.36 kg/m .       NUTRITION INTERVENTION:  Nutrition Prescription: Energy Intake: 1200 kcal/day  Education given to support: general nutrition guidelines, weight reduction, exercise and portion control  Education Materials Provided: My Plate Planner/Choose My Plate and Fiber Facts, brief FODMAP plan for GI     Discussed what dietary changes has worked well for her in the past and how to incorporate them again.  Discussion on healthy behavior changes that can promote calorie reduction in diet.    We discussed why it is important to incorporate healthy lifestyle interventions to control overall health to prevent complications of being obese including the potential of developing diabetes and preventing heart disease.      Nutritional Psychiatry Your Brain on Food - discussion on how what you eat affects microbiome/ hormones and how you feel physically and emotionally.    Hunger/ " Fullness cues - help identify how pt is feeling before, during, and after eating   Smart Snacking and 20 Ways to eat more fruit and vegetables.    Mindful eating - listening to body vs eating out of stress, boredom, emotion, eating to fuel body for strength and energy   Tracking food - balance of meals and snacks   Importance of daily physical activity as able to promote endorphin release       reduce stress, anxiety, and depression, improve sleep, increase energy level, improve muscle tone and strength ...   Recommend: Discussed options for medications and reduced calorie dietary guidelines.    Zepbound: proper use, mechanism of action, indications, dosing, injection schedule, safety and side effects.  Pt is shown a demonstration of the use of the pen and was able to return demonstration without difficulty.   Dose Escalation Schedule  Start Zepbound at   2.5 mg weekly x 4 weeks increasing as tolerated/ needed for ongoing weight loss and appetite suppression  5.0 mg weekly    7.5 mg weekly   10 mg weekly   12.5 mg weekly    15 mg weekly goal dose    2nd choice would be Wegovy.  Pt is also instructed on proper use, mechanism of action, indications, dosing, injection schedule, safety and side effects.  Pt is shown a demonstration of the use of the pen and was able to return demonstration without difficulty.     Coupons printed.    PATIENT'S BEHAVIOR CHANGE GOALS:   See Patient Instructions for patient stated behavior change goals. AVS was printed and given to patient at today's appointment.    MONITOR / EVALUATE:  RD will monitor/evaluate:  Pertinent Labs  Readiness to change nutrition-related behaviors  Weight change    FOLLOW-UP:  3 months    Time spent in minutes: 60  Encounter: Individual

## 2024-02-27 ENCOUNTER — TELEPHONE (OUTPATIENT)
Dept: FAMILY MEDICINE | Facility: CLINIC | Age: 39
End: 2024-02-27
Payer: COMMERCIAL

## 2024-02-27 NOTE — TELEPHONE ENCOUNTER
Prior Authorization Retail Medication Request    Medication/Dose: Received fax from [employer] regarding PA needed for cholestyramine 4 gm packets    Key:  [id number]  Diagnosis and ICD code (if different than what is on RX):  R14.0--abdominal bloating  New/renewal/insurance change PA/secondary ins. PA:  Previously Tried and Failed:  unknown  Rationale:  pt has been dealing with abdominal bloating, loose stools, had labs done to rule out celiac which came back negative    Insurance   Primary: [facility]  Insurance ID:  [id number]    Secondary (if applicable):  Insurance ID:      Pharmacy Information (if different than what is on RX)  Name:  [facility]--[place], [place]   Phone:  [phone]  Fax:[phone]

## 2024-02-27 NOTE — TELEPHONE ENCOUNTER
Prior Authorization Retail Medication Request    Medication/Dose: Received multiple faxes regarding PA's needed for Mounjaro 2.5 mg, 5 mg, 7.5 mg, 10 mg    Keys:  [id number]; [id number]; [id number]; [id number], respectively  Diagnosis and ICD code (if different than what is on RX):  E66.09--class 2 obesity withour serious comorbidity with BMI of 36.0-36.9  New/renewal/insurance change PA/secondary ins. PA:  Previously Tried and Failed:  unknown  Rationale:  Patient has tried various diets in past without much improvement    Insurance   Primary: [employer]  Insurance ID:  [id number]    Secondary (if applicable):  Insurance ID:      Pharmacy Information (if different than what is on RX)  Name:  [facility], [place]  Phone:  [phone]  Fax:[phone]

## 2024-03-05 DIAGNOSIS — R14.0 ABDOMINAL BLOATING: ICD-10-CM

## 2024-03-05 RX ORDER — CHOLESTYRAMINE 4 G/9G
POWDER, FOR SUSPENSION ORAL
Qty: 378 G | Refills: 1 | OUTPATIENT
Start: 2024-03-05

## 2024-03-06 RX ORDER — CHOLESTYRAMINE 4 G/9G
4 POWDER, FOR SUSPENSION ORAL 2 TIMES DAILY WITH MEALS
Qty: 348.6 G | Refills: 1 | Status: SHIPPED | OUTPATIENT
Start: 2024-03-06

## 2024-05-04 ENCOUNTER — HEALTH MAINTENANCE LETTER (OUTPATIENT)
Age: 39
End: 2024-05-04

## 2024-08-24 ENCOUNTER — MYC REFILL (OUTPATIENT)
Dept: EDUCATION SERVICES | Facility: CLINIC | Age: 39
End: 2024-08-24
Payer: COMMERCIAL

## 2024-08-24 DIAGNOSIS — E66.812 CLASS 2 OBESITY DUE TO EXCESS CALORIES WITHOUT SERIOUS COMORBIDITY WITH BODY MASS INDEX (BMI) OF 36.0 TO 36.9 IN ADULT: ICD-10-CM

## 2024-08-24 DIAGNOSIS — E66.09 CLASS 2 OBESITY DUE TO EXCESS CALORIES WITHOUT SERIOUS COMORBIDITY WITH BODY MASS INDEX (BMI) OF 36.0 TO 36.9 IN ADULT: ICD-10-CM

## 2024-08-26 RX ORDER — TIRZEPATIDE 5 MG/.5ML
5 INJECTION, SOLUTION SUBCUTANEOUS
Qty: 2 ML | Refills: 0 | Status: SHIPPED | OUTPATIENT
Start: 2024-08-26

## 2024-08-26 RX ORDER — TIRZEPATIDE 7.5 MG/.5ML
7.5 INJECTION, SOLUTION SUBCUTANEOUS
Qty: 2 ML | Refills: 0 | Status: SHIPPED | OUTPATIENT
Start: 2024-08-26

## 2024-08-26 RX ORDER — TIRZEPATIDE 2.5 MG/.5ML
2.5 INJECTION, SOLUTION SUBCUTANEOUS
Qty: 2 ML | Refills: 0 | Status: SHIPPED | OUTPATIENT
Start: 2024-08-26

## 2024-08-26 RX ORDER — TIRZEPATIDE 10 MG/.5ML
10 INJECTION, SOLUTION SUBCUTANEOUS
Qty: 2 ML | Refills: 0 | Status: SHIPPED | OUTPATIENT
Start: 2024-08-26

## 2024-11-16 ENCOUNTER — MYC REFILL (OUTPATIENT)
Dept: FAMILY MEDICINE | Facility: CLINIC | Age: 39
End: 2024-11-16
Payer: COMMERCIAL

## 2024-11-16 DIAGNOSIS — B00.1 COLD SORE: ICD-10-CM

## 2024-11-18 RX ORDER — VALACYCLOVIR HYDROCHLORIDE 1 G/1
2000 TABLET, FILM COATED ORAL 2 TIMES DAILY
Qty: 4 TABLET | Refills: 1 | Status: SHIPPED | OUTPATIENT
Start: 2024-11-18

## 2024-11-18 NOTE — TELEPHONE ENCOUNTER
Prescription approved per [facility] Refill Protocol.    Last Written Prescription Date:  2/19/24  Last Fill Quantity: 4,  # refills: 1   Last office visit: 2/19/2024

## 2025-05-17 ENCOUNTER — HEALTH MAINTENANCE LETTER (OUTPATIENT)
Age: 40
End: 2025-05-17

## 2025-06-13 ENCOUNTER — RESULTS FOLLOW-UP (OUTPATIENT)
Dept: FAMILY MEDICINE | Facility: CLINIC | Age: 40
End: 2025-06-13

## 2025-08-14 ENCOUNTER — PATIENT OUTREACH (OUTPATIENT)
Dept: CARE COORDINATION | Facility: CLINIC | Age: 40
End: 2025-08-14
Payer: COMMERCIAL